# Patient Record
Sex: FEMALE | Race: WHITE | NOT HISPANIC OR LATINO | Employment: OTHER | URBAN - METROPOLITAN AREA
[De-identification: names, ages, dates, MRNs, and addresses within clinical notes are randomized per-mention and may not be internally consistent; named-entity substitution may affect disease eponyms.]

---

## 2024-04-26 ENCOUNTER — PRE-ADMISSION TESTING (OUTPATIENT)
Dept: PREADMISSION TESTING | Facility: HOSPITAL | Age: 79
End: 2024-04-26
Payer: MEDICARE

## 2024-04-26 VITALS — HEIGHT: 64 IN | BODY MASS INDEX: 36.36 KG/M2 | WEIGHT: 212.96 LBS

## 2024-04-26 LAB
ANION GAP SERPL CALCULATED.3IONS-SCNC: 12 MMOL/L (ref 5–15)
BUN SERPL-MCNC: 12 MG/DL (ref 8–23)
BUN/CREAT SERPL: 23.1 (ref 7–25)
CALCIUM SPEC-SCNC: 9.1 MG/DL (ref 8.6–10.5)
CHLORIDE SERPL-SCNC: 108 MMOL/L (ref 98–107)
CO2 SERPL-SCNC: 23 MMOL/L (ref 22–29)
CREAT SERPL-MCNC: 0.52 MG/DL (ref 0.57–1)
DEPRECATED RDW RBC AUTO: 46.9 FL (ref 37–54)
EGFRCR SERPLBLD CKD-EPI 2021: 95.8 ML/MIN/1.73
ERYTHROCYTE [DISTWIDTH] IN BLOOD BY AUTOMATED COUNT: 13.3 % (ref 12.3–15.4)
GLUCOSE SERPL-MCNC: 145 MG/DL (ref 65–99)
HBA1C MFR BLD: 6.6 % (ref 4.8–5.6)
HCT VFR BLD AUTO: 47.9 % (ref 34–46.6)
HGB BLD-MCNC: 15.5 G/DL (ref 12–15.9)
INR PPP: 1.12 (ref 0.89–1.12)
MCH RBC QN AUTO: 31 PG (ref 26.6–33)
MCHC RBC AUTO-ENTMCNC: 32.4 G/DL (ref 31.5–35.7)
MCV RBC AUTO: 95.8 FL (ref 79–97)
PLATELET # BLD AUTO: 246 10*3/MM3 (ref 140–450)
PMV BLD AUTO: 9.1 FL (ref 6–12)
POTASSIUM SERPL-SCNC: 3.8 MMOL/L (ref 3.5–5.2)
PROTHROMBIN TIME: 14.6 SECONDS (ref 12.2–14.5)
QT INTERVAL: 376 MS
QTC INTERVAL: 457 MS
RBC # BLD AUTO: 5 10*6/MM3 (ref 3.77–5.28)
SODIUM SERPL-SCNC: 143 MMOL/L (ref 136–145)
WBC NRBC COR # BLD AUTO: 8.99 10*3/MM3 (ref 3.4–10.8)

## 2024-04-26 PROCEDURE — 85610 PROTHROMBIN TIME: CPT

## 2024-04-26 PROCEDURE — 83036 HEMOGLOBIN GLYCOSYLATED A1C: CPT

## 2024-04-26 PROCEDURE — 93005 ELECTROCARDIOGRAM TRACING: CPT

## 2024-04-26 PROCEDURE — 85027 COMPLETE CBC AUTOMATED: CPT

## 2024-04-26 PROCEDURE — 80048 BASIC METABOLIC PNL TOTAL CA: CPT

## 2024-04-26 PROCEDURE — 36415 COLL VENOUS BLD VENIPUNCTURE: CPT

## 2024-04-26 PROCEDURE — 93010 ELECTROCARDIOGRAM REPORT: CPT | Performed by: INTERNAL MEDICINE

## 2024-04-26 RX ORDER — BISOPROLOL FUMARATE 5 MG/1
5 TABLET, FILM COATED ORAL DAILY
COMMUNITY

## 2024-04-26 RX ORDER — DIGOXIN 125 MCG
125 TABLET ORAL
COMMUNITY

## 2024-04-26 RX ORDER — DAPAGLIFLOZIN 10 MG/1
TABLET, FILM COATED ORAL
COMMUNITY

## 2024-04-26 NOTE — PAT
SURVEY COMPLETED IN [facility] WITH NARCOTIC SCREEN    PT INFORMED TO FOLLOW UP WITH COY OFFICE ABOUT TRULICITY LAST DOSE (AWARE TO HOLD FOR WEEK) AND ELIQUIS STOPPAGE- NO STENTS- A FIB RELATED.  PT VERBALIZE UNDERSTANDING.     Clean catch urinalysis not indicated because patient denied recent urinary frequency, urinary urgency, burning/pain upon urination, or flank pain. No recent UTIs.      Patient to apply Chlorhexadine wipes  to surgical area (as instructed) the night before procedure and the AM of procedure. Wipes provided.    Patient instructed to drink 20 ounces of Gatorade or Gatorlyte (if diabetic) and it needs to be completed 1 hour (for Main OR patients) or 2 hours (scheduled  section & [facility] patients) before given arrival time for procedure (NO RED Gatorade and NO Gatorade Zero).    Patient verbalized understanding.    Per Anesthesia Request, patient instructed not to take their ACE/ARB medications on the AM of surgery.    Discussed with patient options for receiving total joint replacement education and assessed patient's ability and preference. Joint Replacement Guide given to patient during PAT visit since not received a copy within the last year. Encouraged patient/family to read guide thoroughly and notify PAT staff with any questions or concerns. Handout provided directing patient to links to watch online videos related to joint replacement surgery on the [facility] website. The handout gives detailed instructions for joining an online joint replacement class through Zoom or phone conference offered on . Patient agreed to participate by watching videos online. Patient verbalized understanding of instructions and to complete the online learning tool survey. Encouraged to share information with family and/or . An overview of the joint replacement education was provided during the visit including general perioperative instructions that are routine for all surgical patients (PAT  PASS, wipes, directions to pre-op, etc.).    In[employer] (by [employer]) pain pump patient informational handout given to patient.  Instructed patient to watch [employer] Patient Education Video regarding Peripheral Nerve Catheter that will be in place for upcoming surgery unless contraindicated. The video can be accessed using QR code noted on handout.  Patient agreed to watch video.  Stressed to patient to call [facility] Hotline 24/7 if patient has any questions or concerns after discharge.     It was noted during Pre Admission Testing that patient was wearing some form of fingernail polish (gel/regular) and/or acrylic/artificial nails.  Patient was told that polish and/or artificial nails must be removed for surgery.  If a patient had recent manicure, and would rather not remove polish or artificial nails. Then the minimum requirement is that the polish/artificial nails must be removed from the middle finger on each hand.  Patient verbalized understanding.    If patient was having surgery on an upper extremity, then the patient was instructed that fingernail polish/artificial fingernails must be removed for surgery.  NO EXCEPTIONS.  Patient verbalized understanding.    If patient was having surgery on a lower extremity, then the patient was instructed that toenail polish on both extremities must be removed for surgery.  NO EXCEPTIONS. Patient verbalized understanding.

## 2024-05-08 ENCOUNTER — ANESTHESIA EVENT (OUTPATIENT)
Dept: PERIOP | Facility: HOSPITAL | Age: 79
End: 2024-05-08
Payer: MEDICARE

## 2024-05-08 RX ORDER — SODIUM CHLORIDE 0.9 % (FLUSH) 0.9 %
10 SYRINGE (ML) INJECTION AS NEEDED
Status: CANCELLED | OUTPATIENT
Start: 2024-05-08

## 2024-05-08 RX ORDER — SODIUM CHLORIDE 0.9 % (FLUSH) 0.9 %
10 SYRINGE (ML) INJECTION EVERY 12 HOURS SCHEDULED
Status: CANCELLED | OUTPATIENT
Start: 2024-05-08

## 2024-05-08 RX ORDER — FAMOTIDINE 10 MG/ML
20 INJECTION, SOLUTION INTRAVENOUS ONCE
Status: CANCELLED | OUTPATIENT
Start: 2024-05-08 | End: 2024-05-08

## 2024-05-08 RX ORDER — SODIUM CHLORIDE 9 MG/ML
40 INJECTION, SOLUTION INTRAVENOUS AS NEEDED
Status: CANCELLED | OUTPATIENT
Start: 2024-05-08

## 2024-05-09 ENCOUNTER — APPOINTMENT (OUTPATIENT)
Dept: GENERAL RADIOLOGY | Facility: HOSPITAL | Age: 79
End: 2024-05-09
Payer: MEDICARE

## 2024-05-09 ENCOUNTER — ANESTHESIA (OUTPATIENT)
Dept: PERIOP | Facility: HOSPITAL | Age: 79
End: 2024-05-09
Payer: MEDICARE

## 2024-05-09 ENCOUNTER — ANESTHESIA EVENT CONVERTED (OUTPATIENT)
Dept: ANESTHESIOLOGY | Facility: HOSPITAL | Age: 79
End: 2024-05-09
Payer: MEDICARE

## 2024-05-09 ENCOUNTER — HOSPITAL ENCOUNTER (OUTPATIENT)
Facility: HOSPITAL | Age: 79
Discharge: HOME | End: 2024-05-11
Attending: ORTHOPAEDIC SURGERY | Admitting: ORTHOPAEDIC SURGERY
Payer: MEDICARE

## 2024-05-09 DIAGNOSIS — Z96.652 STATUS POST LEFT KNEE REPLACEMENT: Primary | ICD-10-CM

## 2024-05-09 DIAGNOSIS — M17.12 PRIMARY LOCALIZED OSTEOARTHRITIS OF LEFT KNEE: ICD-10-CM

## 2024-05-09 PROBLEM — I48.91 A-FIB: Status: ACTIVE | Noted: 2024-05-09

## 2024-05-09 PROBLEM — I10 HYPERTENSION: Status: ACTIVE | Noted: 2024-05-09

## 2024-05-09 PROBLEM — E11.9 DIABETES: Status: ACTIVE | Noted: 2024-05-09

## 2024-05-09 LAB
GLUCOSE BLDC GLUCOMTR-MCNC: 116 MG/DL (ref 70–130)
GLUCOSE BLDC GLUCOMTR-MCNC: 144 MG/DL (ref 70–130)
GLUCOSE BLDC GLUCOMTR-MCNC: 156 MG/DL (ref 70–130)
GLUCOSE BLDC GLUCOMTR-MCNC: 162 MG/DL (ref 70–130)
GLUCOSE BLDC GLUCOMTR-MCNC: 169 MG/DL (ref 70–130)
POTASSIUM SERPL-SCNC: 3.9 MMOL/L (ref 3.5–5.2)

## 2024-05-09 PROCEDURE — 25810000003 SODIUM CHLORIDE 0.9 % SOLUTION: Performed by: ORTHOPAEDIC SURGERY

## 2024-05-09 PROCEDURE — 25010000002 FENTANYL CITRATE (PF) 50 MCG/ML SOLUTION

## 2024-05-09 PROCEDURE — C1776 JOINT DEVICE (IMPLANTABLE): HCPCS | Performed by: ORTHOPAEDIC SURGERY

## 2024-05-09 PROCEDURE — C1755 CATHETER, INTRASPINAL: HCPCS | Performed by: ORTHOPAEDIC SURGERY

## 2024-05-09 PROCEDURE — 63710000001 ATORVASTATIN 40 MG TABLET: Performed by: NURSE PRACTITIONER

## 2024-05-09 PROCEDURE — 73560 X-RAY EXAM OF KNEE 1 OR 2: CPT

## 2024-05-09 PROCEDURE — 84132 ASSAY OF SERUM POTASSIUM: CPT | Performed by: ANESTHESIOLOGY

## 2024-05-09 PROCEDURE — 25010000002 ROPIVACAINE HCL-NACL 0.2-0.9 % SOLUTION: Performed by: NURSE ANESTHETIST, CERTIFIED REGISTERED

## 2024-05-09 PROCEDURE — 25810000003 LACTATED RINGERS PER 1000 ML: Performed by: ANESTHESIOLOGY

## 2024-05-09 PROCEDURE — 82948 REAGENT STRIP/BLOOD GLUCOSE: CPT

## 2024-05-09 PROCEDURE — 25010000002 DEXAMETHASONE PER 1 MG: Performed by: NURSE ANESTHETIST, CERTIFIED REGISTERED

## 2024-05-09 PROCEDURE — 97162 PT EVAL MOD COMPLEX 30 MIN: CPT

## 2024-05-09 PROCEDURE — 63710000001 LISINOPRIL 10 MG TABLET: Performed by: NURSE PRACTITIONER

## 2024-05-09 PROCEDURE — 63710000001 ACETAMINOPHEN EXTRA STRENGTH 500 MG TABLET: Performed by: ORTHOPAEDIC SURGERY

## 2024-05-09 PROCEDURE — A9270 NON-COVERED ITEM OR SERVICE: HCPCS | Performed by: ORTHOPAEDIC SURGERY

## 2024-05-09 PROCEDURE — 25010000002 ONDANSETRON PER 1 MG: Performed by: NURSE ANESTHETIST, CERTIFIED REGISTERED

## 2024-05-09 PROCEDURE — 25010000002 CEFAZOLIN PER 500 MG: Performed by: ORTHOPAEDIC SURGERY

## 2024-05-09 PROCEDURE — 63710000001 OXYCODONE 5 MG TABLET: Performed by: ORTHOPAEDIC SURGERY

## 2024-05-09 PROCEDURE — 25010000002 MORPHINE SULFATE (PF) 10 MG/ML SOLUTION 1 ML VIAL: Performed by: ORTHOPAEDIC SURGERY

## 2024-05-09 PROCEDURE — C1713 ANCHOR/SCREW BN/BN,TIS/BN: HCPCS | Performed by: ORTHOPAEDIC SURGERY

## 2024-05-09 PROCEDURE — 97116 GAIT TRAINING THERAPY: CPT

## 2024-05-09 PROCEDURE — 25010000002 ROPIVACAINE PER 1 MG: Performed by: ORTHOPAEDIC SURGERY

## 2024-05-09 PROCEDURE — 63710000001 INSULIN LISPRO (HUMAN) PER 5 UNITS: Performed by: NURSE PRACTITIONER

## 2024-05-09 PROCEDURE — 25010000002 BUPIVACAINE (PF) 0.25 % SOLUTION: Performed by: NURSE ANESTHETIST, CERTIFIED REGISTERED

## 2024-05-09 PROCEDURE — A9270 NON-COVERED ITEM OR SERVICE: HCPCS | Performed by: NURSE PRACTITIONER

## 2024-05-09 PROCEDURE — 25010000002 MEPIVACAINE HCL (PF) 1.5 % SOLUTION: Performed by: NURSE ANESTHETIST, CERTIFIED REGISTERED

## 2024-05-09 PROCEDURE — 25010000002 PROPOFOL 10 MG/ML EMULSION: Performed by: NURSE ANESTHETIST, CERTIFIED REGISTERED

## 2024-05-09 PROCEDURE — 25010000002 MIDAZOLAM PER 1 MG: Performed by: ANESTHESIOLOGY

## 2024-05-09 DEVICE — LEGION PS OXINIUM FEMORAL SZ 7 LEFT
Type: IMPLANTABLE DEVICE | Site: KNEE | Status: FUNCTIONAL
Brand: LEGION

## 2024-05-09 DEVICE — DEV CONTRL TISS STRATAFIX SPIRAL MNCRYL PLS PS 3/0 30CM UD: Type: IMPLANTABLE DEVICE | Site: KNEE | Status: FUNCTIONAL

## 2024-05-09 DEVICE — GENESIS II NON-POROUS TIBIAL                                    BASEPLATE SIZE 5 LEFT
Type: IMPLANTABLE DEVICE | Site: KNEE | Status: FUNCTIONAL
Brand: GENESIS II

## 2024-05-09 DEVICE — IMPLANTABLE DEVICE: Type: IMPLANTABLE DEVICE | Site: KNEE | Status: FUNCTIONAL

## 2024-05-09 DEVICE — LEGION PS HIGH FLEX XLPE SZ 5-6 10MM
Type: IMPLANTABLE DEVICE | Site: KNEE | Status: FUNCTIONAL
Brand: LEGION

## 2024-05-09 DEVICE — GENESIS II RESURFACING PATELLAR                                    PROSTHESIS  32MM
Type: IMPLANTABLE DEVICE | Site: KNEE | Status: FUNCTIONAL
Brand: GENESIS II

## 2024-05-09 DEVICE — CMT BONE PALACOS R HI/VISC 1X40: Type: IMPLANTABLE DEVICE | Site: KNEE | Status: FUNCTIONAL

## 2024-05-09 DEVICE — DEV CONTRL TISS STRATAFIX SYMM PDS PLUS VIL CT-1 60CM: Type: IMPLANTABLE DEVICE | Site: KNEE | Status: FUNCTIONAL

## 2024-05-09 RX ORDER — DIPHENHYDRAMINE HYDROCHLORIDE 50 MG/ML
25 INJECTION INTRAMUSCULAR; INTRAVENOUS EVERY 6 HOURS PRN
Status: DISCONTINUED | OUTPATIENT
Start: 2024-05-09 | End: 2024-05-11 | Stop reason: HOSPADM

## 2024-05-09 RX ORDER — FAMOTIDINE 20 MG/1
20 TABLET, FILM COATED ORAL ONCE
Status: COMPLETED | OUTPATIENT
Start: 2024-05-09 | End: 2024-05-09

## 2024-05-09 RX ORDER — HYDROMORPHONE HYDROCHLORIDE 1 MG/ML
0.5 INJECTION, SOLUTION INTRAMUSCULAR; INTRAVENOUS; SUBCUTANEOUS
Status: DISCONTINUED | OUTPATIENT
Start: 2024-05-09 | End: 2024-05-09 | Stop reason: HOSPADM

## 2024-05-09 RX ORDER — DIPHENHYDRAMINE HCL 25 MG
25 CAPSULE ORAL EVERY 6 HOURS PRN
Status: DISCONTINUED | OUTPATIENT
Start: 2024-05-09 | End: 2024-05-11 | Stop reason: HOSPADM

## 2024-05-09 RX ORDER — SODIUM CHLORIDE, SODIUM LACTATE, POTASSIUM CHLORIDE, CALCIUM CHLORIDE 600; 310; 30; 20 MG/100ML; MG/100ML; MG/100ML; MG/100ML
9 INJECTION, SOLUTION INTRAVENOUS CONTINUOUS
Status: DISCONTINUED | OUTPATIENT
Start: 2024-05-09 | End: 2024-05-11 | Stop reason: HOSPADM

## 2024-05-09 RX ORDER — DEXTROSE MONOHYDRATE 25 G/50ML
25 INJECTION, SOLUTION INTRAVENOUS
Status: DISCONTINUED | OUTPATIENT
Start: 2024-05-09 | End: 2024-05-11 | Stop reason: HOSPADM

## 2024-05-09 RX ORDER — DIGOXIN 125 MCG
125 TABLET ORAL
Status: DISCONTINUED | OUTPATIENT
Start: 2024-05-10 | End: 2024-05-11 | Stop reason: HOSPADM

## 2024-05-09 RX ORDER — BISACODYL 5 MG/1
10 TABLET, DELAYED RELEASE ORAL DAILY PRN
Status: DISCONTINUED | OUTPATIENT
Start: 2024-05-09 | End: 2024-05-11 | Stop reason: HOSPADM

## 2024-05-09 RX ORDER — TRANEXAMIC ACID 10 MG/ML
1000 INJECTION, SOLUTION INTRAVENOUS ONCE
Status: COMPLETED | OUTPATIENT
Start: 2024-05-09 | End: 2024-05-09

## 2024-05-09 RX ORDER — ONDANSETRON 2 MG/ML
INJECTION INTRAMUSCULAR; INTRAVENOUS AS NEEDED
Status: DISCONTINUED | OUTPATIENT
Start: 2024-05-09 | End: 2024-05-09 | Stop reason: SURG

## 2024-05-09 RX ORDER — MAGNESIUM HYDROXIDE 1200 MG/15ML
LIQUID ORAL AS NEEDED
Status: DISCONTINUED | OUTPATIENT
Start: 2024-05-09 | End: 2024-05-09 | Stop reason: HOSPADM

## 2024-05-09 RX ORDER — DULOXETIN HYDROCHLORIDE 60 MG/1
60 CAPSULE, DELAYED RELEASE ORAL DAILY
Status: DISCONTINUED | OUTPATIENT
Start: 2024-05-10 | End: 2024-05-11 | Stop reason: HOSPADM

## 2024-05-09 RX ORDER — IBUPROFEN 600 MG/1
1 TABLET ORAL
Status: DISCONTINUED | OUTPATIENT
Start: 2024-05-09 | End: 2024-05-11 | Stop reason: HOSPADM

## 2024-05-09 RX ORDER — INSULIN LISPRO 100 [IU]/ML
2-7 INJECTION, SOLUTION INTRAVENOUS; SUBCUTANEOUS
Status: DISCONTINUED | OUTPATIENT
Start: 2024-05-09 | End: 2024-05-11 | Stop reason: HOSPADM

## 2024-05-09 RX ORDER — ATORVASTATIN CALCIUM 40 MG/1
40 TABLET, FILM COATED ORAL NIGHTLY
Status: DISCONTINUED | OUTPATIENT
Start: 2024-05-09 | End: 2024-05-11 | Stop reason: HOSPADM

## 2024-05-09 RX ORDER — ONDANSETRON 2 MG/ML
4 INJECTION INTRAMUSCULAR; INTRAVENOUS ONCE AS NEEDED
Status: DISCONTINUED | OUTPATIENT
Start: 2024-05-09 | End: 2024-05-09 | Stop reason: HOSPADM

## 2024-05-09 RX ORDER — OXYCODONE HYDROCHLORIDE 10 MG/1
10 TABLET ORAL EVERY 4 HOURS PRN
Status: DISCONTINUED | OUTPATIENT
Start: 2024-05-09 | End: 2024-05-11 | Stop reason: HOSPADM

## 2024-05-09 RX ORDER — BISACODYL 10 MG
10 SUPPOSITORY, RECTAL RECTAL DAILY PRN
Status: DISCONTINUED | OUTPATIENT
Start: 2024-05-09 | End: 2024-05-11 | Stop reason: HOSPADM

## 2024-05-09 RX ORDER — SODIUM CHLORIDE 9 MG/ML
100 INJECTION, SOLUTION INTRAVENOUS CONTINUOUS
Status: DISCONTINUED | OUTPATIENT
Start: 2024-05-09 | End: 2024-05-11 | Stop reason: HOSPADM

## 2024-05-09 RX ORDER — ONDANSETRON 2 MG/ML
4 INJECTION INTRAMUSCULAR; INTRAVENOUS EVERY 6 HOURS PRN
Status: DISCONTINUED | OUTPATIENT
Start: 2024-05-09 | End: 2024-05-09 | Stop reason: SDUPTHER

## 2024-05-09 RX ORDER — HYDROXYZINE HYDROCHLORIDE 25 MG/1
25 TABLET, FILM COATED ORAL 3 TIMES DAILY PRN
Status: DISCONTINUED | OUTPATIENT
Start: 2024-05-09 | End: 2024-05-09

## 2024-05-09 RX ORDER — ACETAMINOPHEN 500 MG
1000 TABLET ORAL EVERY 6 HOURS
Status: DISCONTINUED | OUTPATIENT
Start: 2024-05-09 | End: 2024-05-11 | Stop reason: HOSPADM

## 2024-05-09 RX ORDER — BISOPROLOL FUMARATE 5 MG/1
5 TABLET, FILM COATED ORAL DAILY
Status: DISCONTINUED | OUTPATIENT
Start: 2024-05-10 | End: 2024-05-11 | Stop reason: HOSPADM

## 2024-05-09 RX ORDER — DOCUSATE SODIUM 100 MG/1
100 CAPSULE, LIQUID FILLED ORAL 2 TIMES DAILY PRN
Status: DISCONTINUED | OUTPATIENT
Start: 2024-05-09 | End: 2024-05-11 | Stop reason: HOSPADM

## 2024-05-09 RX ORDER — MIDAZOLAM HYDROCHLORIDE 1 MG/ML
1 INJECTION INTRAMUSCULAR; INTRAVENOUS ONCE
Status: COMPLETED | OUTPATIENT
Start: 2024-05-09 | End: 2024-05-09

## 2024-05-09 RX ORDER — LISINOPRIL 10 MG/1
10 TABLET ORAL DAILY
Status: DISCONTINUED | OUTPATIENT
Start: 2024-05-09 | End: 2024-05-11 | Stop reason: HOSPADM

## 2024-05-09 RX ORDER — LABETALOL HYDROCHLORIDE 5 MG/ML
10 INJECTION, SOLUTION INTRAVENOUS EVERY 4 HOURS PRN
Status: DISCONTINUED | OUTPATIENT
Start: 2024-05-09 | End: 2024-05-11 | Stop reason: HOSPADM

## 2024-05-09 RX ORDER — NALOXONE HCL 0.4 MG/ML
0.1 VIAL (ML) INJECTION
Status: DISCONTINUED | OUTPATIENT
Start: 2024-05-09 | End: 2024-05-11 | Stop reason: HOSPADM

## 2024-05-09 RX ORDER — BUPIVACAINE HYDROCHLORIDE 2.5 MG/ML
INJECTION, SOLUTION EPIDURAL; INFILTRATION; INTRACAUDAL
Status: DISCONTINUED | OUTPATIENT
Start: 2024-05-09 | End: 2024-05-09 | Stop reason: SURG

## 2024-05-09 RX ORDER — DEXAMETHASONE SODIUM PHOSPHATE 4 MG/ML
INJECTION, SOLUTION INTRA-ARTICULAR; INTRALESIONAL; INTRAMUSCULAR; INTRAVENOUS; SOFT TISSUE AS NEEDED
Status: DISCONTINUED | OUTPATIENT
Start: 2024-05-09 | End: 2024-05-09 | Stop reason: SURG

## 2024-05-09 RX ORDER — ONDANSETRON 2 MG/ML
4 INJECTION INTRAMUSCULAR; INTRAVENOUS EVERY 6 HOURS PRN
Status: DISCONTINUED | OUTPATIENT
Start: 2024-05-09 | End: 2024-05-11 | Stop reason: HOSPADM

## 2024-05-09 RX ORDER — KETOROLAC TROMETHAMINE 30 MG/ML
15 INJECTION, SOLUTION INTRAMUSCULAR; INTRAVENOUS EVERY 6 HOURS PRN
Status: DISCONTINUED | OUTPATIENT
Start: 2024-05-09 | End: 2024-05-11 | Stop reason: HOSPADM

## 2024-05-09 RX ORDER — FENTANYL CITRATE 50 UG/ML
50 INJECTION, SOLUTION INTRAMUSCULAR; INTRAVENOUS
Status: DISCONTINUED | OUTPATIENT
Start: 2024-05-09 | End: 2024-05-09 | Stop reason: HOSPADM

## 2024-05-09 RX ORDER — LIDOCAINE HYDROCHLORIDE 10 MG/ML
0.5 INJECTION, SOLUTION EPIDURAL; INFILTRATION; INTRACAUDAL; PERINEURAL ONCE AS NEEDED
Status: COMPLETED | OUTPATIENT
Start: 2024-05-09 | End: 2024-05-09

## 2024-05-09 RX ORDER — LIDOCAINE HYDROCHLORIDE 10 MG/ML
INJECTION, SOLUTION EPIDURAL; INFILTRATION; INTRACAUDAL; PERINEURAL AS NEEDED
Status: DISCONTINUED | OUTPATIENT
Start: 2024-05-09 | End: 2024-05-09 | Stop reason: SURG

## 2024-05-09 RX ORDER — OXYCODONE HYDROCHLORIDE 5 MG/1
5 TABLET ORAL EVERY 4 HOURS PRN
Status: DISCONTINUED | OUTPATIENT
Start: 2024-05-09 | End: 2024-05-11 | Stop reason: HOSPADM

## 2024-05-09 RX ORDER — MIDAZOLAM HYDROCHLORIDE 1 MG/ML
0.5 INJECTION INTRAMUSCULAR; INTRAVENOUS
Status: DISCONTINUED | OUTPATIENT
Start: 2024-05-09 | End: 2024-05-09 | Stop reason: HOSPADM

## 2024-05-09 RX ORDER — ONDANSETRON 4 MG/1
4 TABLET, ORALLY DISINTEGRATING ORAL EVERY 6 HOURS PRN
Status: DISCONTINUED | OUTPATIENT
Start: 2024-05-09 | End: 2024-05-11 | Stop reason: HOSPADM

## 2024-05-09 RX ORDER — NICOTINE POLACRILEX 4 MG
15 LOZENGE BUCCAL
Status: DISCONTINUED | OUTPATIENT
Start: 2024-05-09 | End: 2024-05-11 | Stop reason: HOSPADM

## 2024-05-09 RX ORDER — ACETAMINOPHEN 500 MG
1000 TABLET ORAL ONCE
Status: COMPLETED | OUTPATIENT
Start: 2024-05-09 | End: 2024-05-09

## 2024-05-09 RX ORDER — AMOXICILLIN 250 MG
2 CAPSULE ORAL 2 TIMES DAILY PRN
Status: DISCONTINUED | OUTPATIENT
Start: 2024-05-09 | End: 2024-05-11 | Stop reason: HOSPADM

## 2024-05-09 RX ORDER — ROPIVACAINE HYDROCHLORIDE 2 MG/ML
INJECTION, SOLUTION EPIDURAL; INFILTRATION; PERINEURAL CONTINUOUS
Status: DISCONTINUED | OUTPATIENT
Start: 2024-05-09 | End: 2024-05-11 | Stop reason: HOSPADM

## 2024-05-09 RX ORDER — PREGABALIN 75 MG/1
75 CAPSULE ORAL ONCE
Status: COMPLETED | OUTPATIENT
Start: 2024-05-09 | End: 2024-05-09

## 2024-05-09 RX ORDER — HYDROXYZINE HYDROCHLORIDE 25 MG/1
25 TABLET, FILM COATED ORAL 3 TIMES DAILY PRN
Status: DISCONTINUED | OUTPATIENT
Start: 2024-05-09 | End: 2024-05-11 | Stop reason: HOSPADM

## 2024-05-09 RX ORDER — HYDROMORPHONE HYDROCHLORIDE 1 MG/ML
0.5 INJECTION, SOLUTION INTRAMUSCULAR; INTRAVENOUS; SUBCUTANEOUS
Status: DISCONTINUED | OUTPATIENT
Start: 2024-05-09 | End: 2024-05-11 | Stop reason: HOSPADM

## 2024-05-09 RX ORDER — FENTANYL CITRATE 50 UG/ML
INJECTION, SOLUTION INTRAMUSCULAR; INTRAVENOUS
Status: COMPLETED
Start: 2024-05-09 | End: 2024-05-09

## 2024-05-09 RX ORDER — MELOXICAM 15 MG/1
15 TABLET ORAL ONCE
Status: COMPLETED | OUTPATIENT
Start: 2024-05-09 | End: 2024-05-09

## 2024-05-09 RX ORDER — PROPOFOL 10 MG/ML
VIAL (ML) INTRAVENOUS AS NEEDED
Status: DISCONTINUED | OUTPATIENT
Start: 2024-05-09 | End: 2024-05-09 | Stop reason: SURG

## 2024-05-09 RX ADMIN — MIDAZOLAM HYDROCHLORIDE 1 MG: 1 INJECTION, SOLUTION INTRAMUSCULAR; INTRAVENOUS at 09:05

## 2024-05-09 RX ADMIN — FENTANYL CITRATE 50 MCG: 50 INJECTION, SOLUTION INTRAMUSCULAR; INTRAVENOUS at 11:56

## 2024-05-09 RX ADMIN — PROPOFOL 40 MG: 10 INJECTION, EMULSION INTRAVENOUS at 10:02

## 2024-05-09 RX ADMIN — ACETAMINOPHEN 1000 MG: 500 TABLET ORAL at 08:36

## 2024-05-09 RX ADMIN — TRANEXAMIC ACID 1000 MG: 10 INJECTION, SOLUTION INTRAVENOUS at 11:27

## 2024-05-09 RX ADMIN — BUPIVACAINE HYDROCHLORIDE 20 ML: 2.5 INJECTION, SOLUTION EPIDURAL; INFILTRATION; INTRACAUDAL; PERINEURAL at 11:56

## 2024-05-09 RX ADMIN — PREGABALIN 75 MG: 75 CAPSULE ORAL at 08:37

## 2024-05-09 RX ADMIN — MEPIVACAINE HYDROCHLORIDE 4 ML: 15 INJECTION, SOLUTION EPIDURAL; INFILTRATION at 10:03

## 2024-05-09 RX ADMIN — SODIUM CHLORIDE 2000 MG: 900 INJECTION INTRAVENOUS at 10:02

## 2024-05-09 RX ADMIN — SODIUM CHLORIDE, POTASSIUM CHLORIDE, SODIUM LACTATE AND CALCIUM CHLORIDE: 600; 310; 30; 20 INJECTION, SOLUTION INTRAVENOUS at 10:43

## 2024-05-09 RX ADMIN — SODIUM CHLORIDE 100 ML/HR: 9 INJECTION, SOLUTION INTRAVENOUS at 16:23

## 2024-05-09 RX ADMIN — Medication 1000 MG: at 11:57

## 2024-05-09 RX ADMIN — FAMOTIDINE 20 MG: 20 TABLET, FILM COATED ORAL at 08:37

## 2024-05-09 RX ADMIN — LISINOPRIL 10 MG: 10 TABLET ORAL at 16:23

## 2024-05-09 RX ADMIN — MELOXICAM 15 MG: 15 TABLET ORAL at 08:37

## 2024-05-09 RX ADMIN — PROPOFOL 77 MCG/KG/MIN: 10 INJECTION, EMULSION INTRAVENOUS at 10:02

## 2024-05-09 RX ADMIN — INSULIN LISPRO 2 UNITS: 100 INJECTION, SOLUTION INTRAVENOUS; SUBCUTANEOUS at 21:40

## 2024-05-09 RX ADMIN — ATORVASTATIN CALCIUM 40 MG: 40 TABLET, FILM COATED ORAL at 21:33

## 2024-05-09 RX ADMIN — LIDOCAINE HYDROCHLORIDE 0.5 ML: 10 INJECTION, SOLUTION EPIDURAL; INFILTRATION; INTRACAUDAL; PERINEURAL at 08:23

## 2024-05-09 RX ADMIN — ACETAMINOPHEN 1000 MG: 500 TABLET ORAL at 21:32

## 2024-05-09 RX ADMIN — ACETAMINOPHEN 1000 MG: 500 TABLET ORAL at 16:23

## 2024-05-09 RX ADMIN — SODIUM CHLORIDE, POTASSIUM CHLORIDE, SODIUM LACTATE AND CALCIUM CHLORIDE 9 ML/HR: 600; 310; 30; 20 INJECTION, SOLUTION INTRAVENOUS at 08:24

## 2024-05-09 RX ADMIN — SODIUM CHLORIDE 2 G: 900 INJECTION INTRAVENOUS at 18:04

## 2024-05-09 RX ADMIN — LIDOCAINE HYDROCHLORIDE 50 MG: 10 INJECTION, SOLUTION EPIDURAL; INFILTRATION; INTRACAUDAL; PERINEURAL at 10:02

## 2024-05-09 RX ADMIN — TRANEXAMIC ACID 1000 MG: 10 INJECTION, SOLUTION INTRAVENOUS at 10:02

## 2024-05-09 RX ADMIN — INSULIN LISPRO 2 UNITS: 100 INJECTION, SOLUTION INTRAVENOUS; SUBCUTANEOUS at 16:43

## 2024-05-09 RX ADMIN — SODIUM CHLORIDE 100 ML/HR: 9 INJECTION, SOLUTION INTRAVENOUS at 18:27

## 2024-05-09 RX ADMIN — ONDANSETRON 4 MG: 2 INJECTION INTRAMUSCULAR; INTRAVENOUS at 10:02

## 2024-05-09 RX ADMIN — OXYCODONE HYDROCHLORIDE 5 MG: 5 TABLET ORAL at 21:33

## 2024-05-09 RX ADMIN — DEXAMETHASONE SODIUM PHOSPHATE 8 MG: 4 INJECTION INTRA-ARTICULAR; INTRALESIONAL; INTRAMUSCULAR; INTRAVENOUS; SOFT TISSUE at 10:02

## 2024-05-09 NOTE — PLAN OF CARE
Goal Outcome Evaluation:  Plan of Care Reviewed With: patient        Progress: no change  Outcome Evaluation: Pt amb 80' with FWW and CGAx2. Step-to gait pattern observed. Activity limited by elevated pain. No knee buckling or LOB noted. HEP and precautions reviewed with pt. Additional ambulation tonight with nsg encouraged. PADD score = 3. Recommend d/c to IRF to promote increased independence with functional mobility and decreased risk for falls prior to returning home with limited support.      Anticipated Discharge Disposition (PT): inpatient rehabilitation facility

## 2024-05-09 NOTE — H&P
Pre-Op H&P  Lesvia Madera  [id number]  1945      Chief complaint: Left knee pain      Subjective:  Patient is a 78 y.o.female presents for scheduled surgery by Dr. Olivera. She anticipates a TOTAL KNEE ARTHROPLASTY WITH CORI ROBOT - LEFT - Left today.  The patient endorses having left knee pain for the past few years.  She endorses having stiffness in her left knee that is constant.  Physical therapy helped initially with the pain.  She does use a cane to ambulate.    The last dose of Eliquis was on 5/2/24.  + cardiac clearance in chart from 3/22/2024.    Review of Systems:  Constitutional-- No fever, chills or sweats. No fatigue.  CV-- No chest pain, palpitation or syncope. +HTN.  Resp-- No SOB, cough, hemoptysis  Skin--No rashes or lesions      Allergies:   Allergies   Allergen Reactions    Sulfamethoxazole-Trimethoprim Hives         Home Meds:  Medications Prior to Admission   Medication Sig Dispense Refill Last Dose    ATORVASTATIN CALCIUM PO Take  by mouth Daily.   5/9/2024 at 0500    bisoprolol (ZEBeta) 5 MG tablet Take 1 tablet by mouth Daily. 1.5 BID   5/9/2024 at 0500    dapagliflozin Propanediol (Farxiga) 10 MG tablet Take  by mouth.   5/9/2024 at 0500    digoxin (LANOXIN) 125 MCG tablet Take 1 tablet by mouth Daily.   5/9/2024 at 0500    DULoxetine HCl (CYMBALTA PO) Take  by mouth Daily.   5/9/2024 at 0500    SPIRONOLACTONE PO Take  by mouth.   5/9/2024 at 0500    Apixaban (ELIQUIS PO) Take  by mouth Daily.   5/2/2024    Dulaglutide (TRULICITY SC) Inject  under the skin into the appropriate area as directed 1 (One) Time Per Week. WEDNESDAY 5/1/2024    LISINOPRIL PO Take  by mouth Daily.   5/7/2024         PMH:   Past Medical History:   Diagnosis Date    Arthritis     BILAT HANDS    Atrial fibrillation     Diabetes mellitus     Hypertension     Tailbone injury     PT STATES ITS CROOKED A BIT    Wears glasses     READERS     PSH:    Past Surgical History:   Procedure Laterality Date    ANKLE  "SURGERY Right     PINS AND PLATES PLACED THEN REMOVED - NO HARDWARE IN PLACE NOW    BREAST BIOPSY Right     X1- NO MARKER    CATARACT EXTRACTION, BILATERAL Bilateral     WISDOM TOOTH EXTRACTION         Immunization History:  Influenza: 2024  Pneumococcal: Yes  Tetanus: Unsure  Covid : x4    Social History:   Tobacco:   Social History     Tobacco Use   Smoking Status Never   Smokeless Tobacco Never      Alcohol:     Social History     Substance and Sexual Activity   Alcohol Use Not Currently         Physical Exam:/62 (BP Location: Right arm, Patient Position: Lying)   Pulse 94   Temp 98.5 °F (36.9 °C) (Temporal)   Resp 18   Ht 162.6 cm (64\")   Wt 96.2 kg (212 lb)   SpO2 96%   BMI 36.39 kg/m²       General Appearance:    Alert, cooperative, no distress, appears stated age   Head:    Normocephalic, without obvious abnormality, atraumatic   Lungs:     Clear to auscultation bilaterally, respirations unlabored    Heart:   Regular rate and rhythm, S1 and S2 normal    Abdomen:    Soft without tenderness   Extremities:   Extremities normal, atraumatic, no cyanosis or edema   Skin:   Skin color, texture, turgor normal, no rashes or lesions   Neurologic:   Grossly intact     Results Review:     LABS:  Lab Results   Component Value Date    WBC 8.99 04/26/2024    HGB 15.5 04/26/2024    HCT 47.9 (H) 04/26/2024    MCV 95.8 04/26/2024     04/26/2024    GLUCOSE 145 (H) 04/26/2024    BUN 12 04/26/2024    CREATININE 0.52 (L) 04/26/2024     04/26/2024    K 3.8 04/26/2024     (H) 04/26/2024    CO2 23.0 04/26/2024    CALCIUM 9.1 04/26/2024       RADIOLOGY:  Imaging Results (Last 72 Hours)       ** No results found for the last 72 hours. **            I reviewed the patient's new clinical results.    Cancer Staging (if applicable)  Cancer Patient: __ yes _x_no __unknown; If yes, clinical stage T:__ N:__M:__, stage group or __N/A      Impression: Left knee arthritis      Plan: TOTAL KNEE ARTHROPLASTY WITH " HAMZAH ROBOT - LEFT - Left       Vic Lopez, APRN   5/9/2024   08:56 EDT

## 2024-05-09 NOTE — H&P
Patient Name: Lesvia Madera  MRN: [id number]  : 1945  DOS: 2024    Attending: Bar Olivera,*    Primary Care Provider: Luigi Mckeon MD      Chief complaint:  Left knee pain    Subjective   Patient is a pleasant 78 y.o. female presented for scheduled surgery by Dr. Olivera.  He underwent left total knee arthroplasty under spinal anesthesia.  She tolerated surgery well and was admitted for further medical management.  Her knee has been painful for over a year.  She uses a brace and cane with ambulation.  She denies recent falls.    When seen postop she is doing well.  Her pain is well-controlled.  She denies nausea, shortness of breath or chest pain.  No history of DVT or PE.    Allergies:  Allergies   Allergen Reactions    Sulfamethoxazole-Trimethoprim Hives       Meds:  Medications Prior to Admission   Medication Sig Dispense Refill Last Dose    ATORVASTATIN CALCIUM PO Take  by mouth Daily.   2024 at 0500    bisoprolol (ZEBeta) 5 MG tablet Take 1 tablet by mouth Daily. 1.5 BID   2024 at 0500    dapagliflozin Propanediol (Farxiga) 10 MG tablet Take  by mouth.   2024 at 0500    digoxin (LANOXIN) 125 MCG tablet Take 1 tablet by mouth Daily.   2024 at 0500    DULoxetine HCl (CYMBALTA PO) Take  by mouth Daily.   2024 at 0500    SPIRONOLACTONE PO Take  by mouth.   2024 at 0500    Apixaban (ELIQUIS PO) Take  by mouth Daily.   2024    Dulaglutide (TRULICITY SC) Inject  under the skin into the appropriate area as directed 1 (One) Time Per Week. 2024    LISINOPRIL PO Take  by mouth Daily.   2024         History:   Past Medical History:   Diagnosis Date    Arthritis     BILAT HANDS    Atrial fibrillation     Diabetes mellitus     Hypertension     Tailbone injury     PT STATES ITS CROOKED A BIT    Wears glasses     READERS     Past Surgical History:   Procedure Laterality Date    ANKLE SURGERY Right     PINS AND PLATES PLACED THEN REMOVED - NO  "HARDWARE IN PLACE NOW    BREAST BIOPSY Right     X1- NO MARKER    CATARACT EXTRACTION, BILATERAL Bilateral     WISDOM TOOTH EXTRACTION       History reviewed. No pertinent family history.  Social History     Tobacco Use    Smoking status: Never    Smokeless tobacco: Never   Vaping Use    Vaping status: Never Used   Substance Use Topics    Alcohol use: Not Currently    Drug use: Never   She lives alone and has 1 child.  She is retired teacher.    Review of Systems  All systems were reviewed and negative except for:  Gastrointestinal: positive for  constipation and diarrhea    Vital Signs  /78 (BP Location: Left arm, Patient Position: Lying)   Pulse 75   Temp 98.7 °F (37.1 °C) (Oral)   Resp 16   Ht 162.6 cm (64\")   Wt 96.2 kg (212 lb)   SpO2 95%   BMI 36.39 kg/m²     Physical Exam:    General Appearance:    Alert, cooperative, in no acute distress   Head:    Normocephalic, without obvious abnormality, atraumatic   Eyes:            Lids and lashes normal, conjunctivae and sclerae normal, no   icterus, no pallor, corneas clear,    Ears:    Ears appear intact with no abnormalities noted   Throat:   No oral lesions, no thrush, oral mucosa moist   Neck:   No adenopathy, supple, trachea midline, no thyromegaly    Lungs:     Clear to auscultation,respirations regular, even and unlabored    Heart:    Regular rhythm and normal rate, normal S1 and S2, no murmur, no gallop   Abdomen:     Normal bowel sounds, no masses, no organomegaly, soft non-tender, non-distended, no guarding, no rebound  tenderness   Genitalia:    Deferred   Extremities: Left knee Ace wrap CDI.  Nerve block present.   Pulses:   Pulses palpable and equal bilaterally   Skin:   No bleeding, bruising or rash   Neurologic:   Cranial nerves 2 - 12 grossly intact. Flexion and dorsiflexion intact bilateral feet.        I reviewed the patient's new clinical results.     Results from last 7 days   Lab Units 05/09/24  0843   POTASSIUM mmol/L 3.9     Lab " Results   Component Value Date    HGBA1C 6.60 (H) 04/26/2024      Latest Reference Range & Units 04/26/24 09:25   Sodium 136 - 145 mmol/L 143   Potassium 3.5 - 5.2 mmol/L 3.8   Chloride 98 - 107 mmol/L 108 (H)   CO2 22.0 - 29.0 mmol/L 23.0   Anion Gap 5.0 - 15.0 mmol/L 12.0   BUN 8 - 23 mg/dL 12   Creatinine 0.57 - 1.00 mg/dL 0.52 (L)   BUN/Creatinine Ratio 7.0 - 25.0  23.1   eGFR >60.0 mL/min/1.73 95.8   Glucose 65 - 99 mg/dL 145 (H)   Calcium 8.6 - 10.5 mg/dL 9.1   Hemoglobin A1C 4.80 - 5.60 % 6.60 (H)   WBC 3.40 - 10.80 10*3/mm3 8.99   RBC 3.77 - 5.28 10*6/mm3 5.00   Hemoglobin 12.0 - 15.9 g/dL 15.5   Hematocrit 34.0 - 46.6 % 47.9 (H)   Platelets 140 - 450 10*3/mm3 246   RDW 12.3 - 15.4 % 13.3   MCV 79.0 - 97.0 fL 95.8   MCH 26.6 - 33.0 pg 31.0   MCHC 31.5 - 35.7 g/dL 32.4   MPV 6.0 - 12.0 fL 9.1   (H): Data is abnormally high  (L): Data is abnormally low    Assessment and Plan:     Status post left knee replacement    Primary localized osteoarthritis of left knee    Hypertension    A-fib    Diabetes      Plan  1. PT/OT- WBAT LLE  2. Pain control-prns, AC nerve block  3. IS-encourage  4. DVT proph- Mechs/Eliquis  5. Bowel regimen  6. Resume home medications as appropriate  7. Monitor post-op labs  8. DC planning for home    HTN, Hyperlipidemia  - Continue home statin, Zebeta, digoxin and lisinopril  - Hold spironolactone for now  - Monitor BP   - Holding parameters for BP meds  - Labetalol PRN for SBP>170    DM  - hgb A1c on 4/26/24 6.6  - Hold OHA while inpatient  - Accu-Chek AC and HS with low dose SSI      KANDY Rudolph  05/09/24  16:04 EDT

## 2024-05-09 NOTE — BRIEF OP NOTE
TOTAL KNEE ARTHROPLASTY WITH CORI ROBOT  Progress Note    Lesvia Madera  5/9/2024    Pre-op Diagnosis:   Left knee osteoarthritis       Post-Op Diagnosis Codes:     * Primary osteoarthritis of left knee [M17.12]    Procedure/CPT® Codes:  PA ARTHRP KNE CONDYLE&PLATU MEDIAL&LAT COMPARTMENTS [53152]  PA CPTR-ASST SURGICAL NAVIGATION IMAGE-LESS [27793]      Procedure(s):  TOTAL KNEE ARTHROPLASTY WITH CORI ROBOT - LEFT    Surgical Approach: Knee Medial Parapatellar            Surgeon(s):  Bar Olivera MD    Assistant: BOBBY Rosas    Anesthesia: Spinal with local and adductor canal catheter    Staff:   Circulator: Pillo Alston RN; Kathy Choe RN  Scrub Person: Shannen Ferrer; Dmitriy Upton  Vendor Representative: Subhash Green ([employer])  Nursing Assistant: Adali Swartz  Assistant: Mino Cullen RNFA  Assistant: Mino Cullen RNFA      Estimated Blood Loss: minimal    Urine Voided: * No values recorded between 5/9/2024  9:58 AM and 5/9/2024 11:46 AM *    Specimens:                None          Drains: * No LDAs found *    Findings: Left knee severe tricompartmental degenerative changes with varus deformity        Complications: None    Plans: [employer] posterior stabilized total knee arthroplasty with a size 7 femur, 5 tibia, 10 polyethylene and 32 patella    Tourniquet time: 83 minutes at 300 mmHg    Assistant: Mino Cullen RNFA  was responsible for performing the following activities: Retraction, assistance with implantation of components and closure and their skilled assistance was necessary for the success of this case.    Bar Olivera MD     Date: 5/9/2024  Time: 12:06 EDT

## 2024-05-09 NOTE — OP NOTE
Preoperative diagnosis:Left knee end stage osteoarthritis    Postoperative diagnosis: Left knee end stage osteoarthritis    Operative procedure: Left total knee arthroplasty with Cori robot computer guidance    Surgeon: Dr. Nir Olivera    Assistant: BOBBY Rosas.  Necessary during the case for assistance with positioning of the patient, exposure, implantation of components and closure.  Necessary for the success and completion of the case.    Anesthesia: Spinal with local and adductor canal catheter    Estimated blood loss: Minimal    Surgical Approach: Knee Medial Parapatellar     Implants: [employer] Legion posterior stabilized total knee arthroplasty size 7 femur, 5 tibia, 32 patella, 10 polyethylene.    Tourniquet time: 83 minutes at 300 mmHg    Reasoning for added complexity modifier: Please note a 22 modifier will be added to this case due to the patient's BMI 36 which added significant time and complexity in order to gain adequate exposure to perform the total knee replacement.    Indications for procedure: Lesvia is a very pleasant 78-year-old female who has struggled with end-stage activity limiting left knee pain secondary to osteoarthritis.  X-rays in March revealed severe tricompartmental degenerative changes in a varus knee with complete loss of medial joint space and marginal osteophyte formation.  They have failed exhaustive conservative treatment measures including cortisone injections and physical therapy.  She takes Eliquis for A-fib.  Last dose was 1 week ago.  No relief with the last left knee cortisone injection in December.  After undergoing a shared decision-making process they agreed to proceed with left total knee arthroplasty.  Surgical consent form was signed.    Description of procedure: The patient was seen in the preoperative holding area and the left knee was signed to confirm the correct operative site.  They were seen by anesthesia.  They received Ancef 2 g IV prophylactic  antibiotics within 1 hour of incision time.  They were brought back to the operating room.  Spinal was performed without difficulty and they were given sedation throughout the case.  Patient placed in the supine position and all bony prominences were well-padded.  Nonsterile tourniquet was applied to the thigh.  The left lower extremity was prepped and draped in the usual sterile fashion and timeout was performed to confirm left total knee arthroplasty for patient Lesvia Madera.    The left lower extremity was exsanguinated with an Esmarch.  Tourniquet was inflated to 300 mmHg.  With the knee flexed a midline incision was made with a 10 blade scalpel.  Adequate hemostasis maintained with Bovie electrocautery.  Full-thickness medial and lateral flaps were elevated.  Using a fresh 10 blade scalpel I made a medial parapatellar arthrotomy.  The patella was everted.  Patella fat pad and anterior femoral fat pads were excised.  Marginal osteophytes were removed.  Medial release was performed for this varus knee using Bovie electrocautery.  Z retractors were placed medially and laterally.    Guide pins for the CORI were placed in the medial tibia shaft and distal medial femur. Sensors were placed and data capture was performed per standard CORI computer guidance technique.  Femoral and tibial sizes were determined.    The distal femoral cut was then made with a dominguez at the previously selected depth and amount of valgus (3-5 degrees). The 4-in-1 cutting guide for the previously selected femur size of 7 was pinned in place at the appropriate amount of external rotation.  Anterior and posterior cuts were made as were the chamfer cuts.  Cut bone was removed.  We then turned our attention to the tibia.    The tibia was subluxed anteriorly. PCL retractor was placed as were medial and lateral Hohmann retractors.  The tibial cutting guide was then pinned in place using CORI guidance for the proximal tibial cut. We then used the  oscillating saw to make the proximal tibial cut and this cut was then checked with the CORI. Medial and lateral menisci were then excised as were posterior osteophytes.    Flexion and extension gaps were then checked and with a 10 mm block we achieved full extension and flexion with excellent alignment. The tibia was sized to a 5 tibial tray centered off the medial third of the tibial tubercle.  The tray was pinned in place.  We then impacted the tibial fins.  Size 7  trial femur was then placed and box cut was made with the reamer and punch. We trialed with a size 10 polyethylene, 7 femur and 5 tibia.  With these trial components in place we achieved full extension and flexion with excellent alignment and stable throughout.     We then turned our attention to the patella.  Patella was sized to 24 mm in thickness and we made a 9 mm patellar cut with the reciprocal saw.  Patella drill holes were made with the appropriate size guide.  A 32 trial button was placed and there was excellent tracking with the no thumbs technique.    Trial components were then removed.  Final components were opened on the back table.  Posterior capsule was injected with 20 mL of half percent ropivacaine and 5 mg of morphine.  Cement was mixed on the back table.  The knee was copiously irrigated with Pulsavac lavage.  The knee was thoroughly dried.  Cement was then applied to the tibia and final size 5 tibial tray was impacted in place and excess cement was removed.  Cement was applied to the femur and final size 7 femur was impacted in place and excess cement removed.  We then placed a 10 mm polyethylene-this was seen to be fully seated in the tibial tray.  Knee was then placed in extension and we applied cement to the cut patellar surface and 32 patella was held in place with patellar clamp.  All excess cement was removed.  The knee was left in extension while cement cured.  While the cement cured we removed the CORI tibial and femoral  pins and sensors.  Tibial pin incisions were closed with 3-0 monocryl.    Once the cement was fully cured we irrigated the knee with diluted Betadine solution and Pulsavac lavage.  The knee was taken through full range of motion and seen to achieve full extension and flexion with excellent patellar tracking.    We then proceeded with closure.  The deep fascia was closed with a #1 Stratafix barbed suture.  Dermis was closed with 2-0 Vicryl.  Skin was closed with a running 3-0 Stratafix barbed subcuticular suture.    A sterile dressing was then applied with mesh dressing and glue.  We then applied 4x4's, ABDs, soft roll and a six-inch Ace bandage.    Tourniquet was deflated at 83 minutes.  Patient was transferred to recovery in stable condition.  All sponge and needle counts were correct ×2.  Patient received first dose of tranexamic acid just prior to incision and the second dose 5-10 minutes prior to letting down the tourniquet to minimize bleeding.    Postoperative plan:  Patient will be admitted to Dr. WOOTEN for medical management  Mobilize starting today with PT/OT as tolerated  Start Eliquis for DVT prophylaxis tomorrow   consult for home physical therapy and home equipment needs  Follow-up with me in 2-3 weeks.    Bar Olivera MD  05/09/24  12:07 EDT    CC: Dr. Luigi Mckeon

## 2024-05-09 NOTE — PLAN OF CARE
Goal Outcome Evaluation:  Plan of Care Reviewed With: patient           Outcome Evaluation: VSS. Alert oriented x4. Ambulates assist x1. Voiding well.

## 2024-05-09 NOTE — THERAPY EVALUATION
Patient Name: Lesvia Madera  : 1945    MRN: [id number]                              Today's Date: 2024       Admit Date: 2024    Visit Dx: No diagnosis found.  Patient Active Problem List   Diagnosis    Primary localized osteoarthritis of left knee    Status post left knee replacement    Hypertension    A-fib    Diabetes     Past Medical History:   Diagnosis Date    Arthritis     BILAT HANDS    Atrial fibrillation     Diabetes mellitus     Hypertension     Tailbone injury     PT STATES ITS CROOKED A BIT    Wears glasses     READERS     Past Surgical History:   Procedure Laterality Date    ANKLE SURGERY Right     PINS AND PLATES PLACED THEN REMOVED - NO HARDWARE IN PLACE NOW    BREAST BIOPSY Right     X1- NO MARKER    CATARACT EXTRACTION, BILATERAL Bilateral     WISDOM TOOTH EXTRACTION        General Information       Row Name 24          Physical Therapy Time and Intention    Document Type evaluation  -     Mode of Treatment physical therapy  -       Row Name 24          General Information    Patient Profile Reviewed yes  -     Prior Level of Function min assist:;all household mobility;community mobility;gait;transfer;bed mobility;ADL's  -     Existing Precautions/Restrictions fall;other (see comments)  adductor canal nerve cath  -     Barriers to Rehab none identified  -       Row Name 24          Living Environment    People in Home alone;other (see comments)  no support at home  -       Row Name 24          Home Main Entrance    Number of Stairs, Main Entrance one  -       Row Name 24          Stairs Within Home, Primary    Stairs, Within Home, Primary all needs met on main level  -     Number of Stairs, Within Home, Primary twelve  -       Row Name 24          Cognition    Orientation Status (Cognition) oriented x 3  -       Row Name 24          Safety Issues, Functional Mobility    Safety  Issues Affecting Function (Mobility) awareness of need for assistance;insight into deficits/self-awareness  -     Impairments Affecting Function (Mobility) balance;endurance/activity tolerance;pain;range of motion (ROM);strength  -               User Key  (r) = Recorded By, (t) = Taken By, (c) = Cosigned By      Initials Name Provider Type    HW Dora Fitzgerald, ESTELLE Physical Therapist                   Mobility       Row Name 05/09/24 1820          Bed Mobility    Comment, (Bed Mobility) Pt UIC  -       Row Name 05/09/24 1820          Transfers    Comment, (Transfers) VC for hand placement and line management  -       Row Name 05/09/24 1820          Sit-Stand Transfer    Sit-Stand [place] (Transfers) contact guard;nonverbal cues (demo/gesture);verbal cues;2 person assist  -     Assistive Device (Sit-Stand Transfers) walker, front-wheeled  -       Row Name 05/09/24 1820 05/09/24 1709       Gait/Stairs (Locomotion)    [place] Level (Gait) contact guard;nonverbal cues (demo/gesture);verbal cues;2 person assist  -HW --    Assistive Device (Gait) walker, front-wheeled  - --    Patient was able to Ambulate yes  -HW yes  -HW    Distance in Feet (Gait) 80  -HW 80  -HW    Deviations/Abnormal Patterns (Gait) bilateral deviations;base of support, wide;weight shifting decreased;stride length decreased;gait speed decreased  -HW --    Bilateral Gait Deviations forward flexed posture;heel strike decreased  -HW --    Comment, (Gait/Stairs) Pt amb in the [place] with step-to gait pattern. Pt demonstrated forward flexed posture, heavy reliance on FWW to offload LLE, slow gait speed, and decreased stride length. Fair improvement with VC. Activity limited by elevated pain. No knee buckling or LOB observed.  -HW --      Row Name 05/09/24 1820          Mobility    Extremity Weight-bearing Status left lower extremity  -     Left Lower Extremity (Weight-bearing Status) weight-bearing as tolerated (WBAT)  -                User Key  (r) = Recorded By, (t) = Taken By, (c) = Cosigned By      Initials Name Provider Type     Dora Fitzgerald PT Physical Therapist                   Obj/Interventions       [facility] Name 05/09/24 1822          Range of Motion Comprehensive    General Range of Motion lower extremity range of motion deficits identified  -     Comment, General Range of Motion Surgical knee ROM: 13-74  -[facility] Name 05/09/24 1822          Strength Comprehensive (MMT)    General Manual Muscle Testing (MMT) Assessment lower extremity strength deficits identified  -     Comment, General Manual Muscle Testing (MMT) Assessment Pt able to perform B active ankle DF and SLR  -[facility] Name 05/09/24 1822          Motor Skills    Therapeutic Exercise knee;ankle  -[facility] Name 05/09/24 1822          Knee (Therapeutic Exercise)    Knee (Therapeutic Exercise) isometric exercises;strengthening exercise  -     Knee Isometrics (Therapeutic Exercise) quad sets;left;10 repetitions  -     Knee Strengthening (Therapeutic Exercise) SLR (straight leg raise);SAQ (short arc quad);LAQ (long arc quad);heel slides;left;3 repetitions  -[facility] Name 05/09/24 1822          Ankle (Therapeutic Exercise)    Ankle (Therapeutic Exercise) AROM (active range of motion)  -     Ankle AROM (Therapeutic Exercise) bilateral;dorsiflexion;plantarflexion;10 repetitions  -[facility] Name 05/09/24 1822          Balance    Balance Assessment sitting static balance;sitting dynamic balance;sit to stand dynamic balance;standing static balance;standing dynamic balance  -     Static Sitting Balance standby assist  -     Dynamic Sitting Balance standby assist  -     Position, Sitting Balance sitting edge of bed  -     Static Standing Balance contact guard  -     Dynamic Standing Balance contact guard  -     Position/Device Used, Standing Balance supported;walker, rolling  -     Balance Interventions sitting;standing;sit to  stand;occupation based/functional task  -       Row Name 05/09/24 1822          Sensory Assessment (Somatosensory)    Sensory Assessment (Somatosensory) LE sensation intact  -               User Key  (r) = Recorded By, (t) = Taken By, (c) = Cosigned By      Initials Name Provider Type    HW Dora Fitzgerald PT Physical Therapist                   Goals/Plan       [facility] Name 05/09/24 1825          Bed Mobility Goal 1 (PT)    Activity/Assistive Device (Bed Mobility Goal 1, PT) sit to supine/supine to sit  -HW     [place] Level/Cues Needed (Bed Mobility Goal 1, PT) modified independence  -HW     Time Frame (Bed Mobility Goal 1, PT) long term goal (LTG);3 days  -[facility] Name 05/09/24 1825          Transfer Goal 1 (PT)    Activity/Assistive Device (Transfer Goal 1, PT) sit-to-stand/stand-to-sit  -HW     [place] Level/Cues Needed (Transfer Goal 1, PT) modified independence  -HW     Time Frame (Transfer Goal 1, PT) long term goal (LTG);3 days  -[facility] Name 05/09/24 1825          Gait Training Goal 1 (PT)    Activity/Assistive Device (Gait Training Goal 1, PT) gait (walking locomotion)  -     [place] Level (Gait Training Goal 1, PT) modified independence  -HW     Distance (Gait Training Goal 1, PT) 150  -HW     Time Frame (Gait Training Goal 1, PT) long term goal (LTG);3 days  -[facility] Name 05/09/24 1825          ROM Goal 1 (PT)    ROM Goal 1 (PT) Surgical Knee ROM: 0-90  -HW     Time Frame (ROM Goal 1, PT) long-term goal (LTG);3 days  -[facility] Name 05/09/24 1825          Therapy Assessment/Plan (PT)    Planned Therapy Interventions (PT) balance training;bed mobility training;gait training;home exercise program;ROM (range of motion);patient/family education;stair training;strengthening;stretching;transfer training  -               User Key  (r) = Recorded By, (t) = Taken By, (c) = Cosigned By      Initials Name Provider Type    HW Dora Fitzgerald PT Physical Therapist                    Clinical Impression       Row Name 05/09/24 1823          Pain    Pretreatment Pain Rating 4/10  -     Posttreatment Pain Rating 4/10  -     Pain Location - Side/Orientation Left  -     Pain Location generalized  -     Pain Location - knee  -     Pain Intervention(s) Ambulation/increased activity;Repositioned;Elevated;Cold applied  -[facility] Name 05/09/24 1823          Plan of Care Review    Plan of Care Reviewed With patient  -     Progress no change  -     Outcome Evaluation Pt amb 80' with FWW and CGAx2. Step-to gait pattern observed. Activity limited by elevated pain. No knee buckling or LOB noted. HEP and precautions reviewed with pt. Additional ambulation tonight with nsg encouraged. PADD score = 3. Recommend d/c to IRF to promote increased independence with functional mobility and decreased risk for falls prior to returning home with limited support.  -HW       Row Name 05/09/24 1823          Therapy Assessment/Plan (PT)    Rehab Potential (PT) good, to achieve stated therapy goals  -     Criteria for Skilled Interventions Met (PT) yes;meets criteria;skilled treatment is necessary  -     Therapy Frequency (PT) 2 times/day  -HW       Row Name 05/09/24 1823          Vital Signs    Pre Patient Position Sitting  -     Intra Patient Position Standing  -     Post Patient Position Sitting  -HW       Row Name 05/09/24 1823          Positioning and Restraints    Pre-Treatment Position sitting in chair/recliner  -     Post Treatment Position chair  -     In Chair notified nsg;reclined;sitting;encouraged to call for assist;call light within reach;exit alarm on;legs elevated;compression device  -               User Key  (r) = Recorded By, (t) = Taken By, (c) = Cosigned By      Initials Name Provider Type     Dora Fitzgerald, PT Physical Therapist                   Outcome Measures       [facility] Name 05/09/24 1823 05/09/24 1518       How much help from another person do you currently need...     Turning from your back to your side while in flat bed without using bedrails? 3  -HW 3  -BC    Moving from lying on back to sitting on the side of a flat bed without bedrails? 3  -HW 3  -BC    Moving to and from a bed to a chair (including a wheelchair)? 3  -HW 3  -BC    Standing up from a chair using your arms (e.g., wheelchair, bedside chair)? 3  -HW 3  -BC    Climbing 3-5 steps with a railing? 2  -HW 2  -BC    To walk in hospital room? 3  -HW 2  -BC    AM-PAC 6 Clicks Score (PT) 17  -HW 16  -BC    Highest Level of Mobility Goal 5 --> Static standing  - 5 --> Static standing  -BC      Row Name 05/09/24 1823          PADD    Diagnosis 1  -     Gender 1  -     Age Group 1  -HW     Gait Distance 0  -HW     Assist Level 1  -HW     Home Support 0  -HW     PADD Score 3  -HW     Patient Preference extended rehabilitation  -     Prediction by PADD Score extended rehabilitation  -       Row Name 05/09/24 1823          Functional Assessment    Outcome Measure Options AM-PAC 6 Clicks Basic Mobility (PT);PADD  -               User Key  (r) = Recorded By, (t) = Taken By, (c) = Cosigned By      Initials Name Provider Type    BC Denise Hull, RN Registered Nurse     Dora Fitzgerald, ESTELLE Physical Therapist                                 Physical Therapy Education       Title: PT OT SLP Therapies (Done)       Topic: Physical Therapy (Done)       Point: Mobility training (Done)       Learning Progress Summary             Patient Acceptance, E,D, VU,DU by  at 5/9/2024 1825                         Point: Home exercise program (Done)       Learning Progress Summary             Patient Acceptance, E,D, VU,DU by  at 5/9/2024 1825                         Point: Body mechanics (Done)       Learning Progress Summary             Patient Acceptance, E,D, VU,DU by  at 5/9/2024 1825                         Point: Precautions (Done)       Learning Progress Summary             Patient Acceptance, E,D, VU,DU by  at 5/9/2024  1825                                         User Key       Initials Effective Dates Name Provider Type Discipline     12/15/23 -  Dora Fitzgerald PT Physical Therapist PT                  PT Recommendation and Plan  Planned Therapy Interventions (PT): balance training, bed mobility training, gait training, home exercise program, ROM (range of motion), patient/family education, stair training, strengthening, stretching, transfer training  Plan of Care Reviewed With: patient  Progress: no change  Outcome Evaluation: Pt amb 80' with FWW and CGAx2. Step-to gait pattern observed. Activity limited by elevated pain. No knee buckling or LOB noted. HEP and precautions reviewed with pt. Additional ambulation tonight with nsg encouraged. PADD score = 3. Recommend d/c to IRF to promote increased independence with functional mobility and decreased risk for falls prior to returning home with limited support.     Time Calculation:   PT Evaluation Complexity  History, PT Evaluation Complexity: 1-2 personal factors and/or comorbidities  Examination of Body Systems (PT Eval Complexity): total of 3 or more elements  Clinical Presentation (PT Evaluation Complexity): evolving  Clinical Decision Making (PT Evaluation Complexity): moderate complexity  Overall Complexity (PT Evaluation Complexity): moderate complexity     PT Charges       Row Name 05/09/24 1827             Time Calculation    Start Time 1645  -HW      PT Received On 05/09/24  -      PT Goal Re-Cert Due Date 05/19/24  -         Timed Charges    97781 - Gait Training Minutes  10  -HW         Untimed Charges    PT Eval/Re-eval Minutes 46  -HW         Total Minutes    Timed Charges Total Minutes 10  -HW      Untimed Charges Total Minutes 46  -       Total Minutes 56  -HW                User Key  (r) = Recorded By, (t) = Taken By, (c) = Cosigned By      Initials Name Provider Type     Dora Fitzgerald PT Physical Therapist                  Therapy Charges for Today        Code Description Service Date Service Provider Modifiers Qty    53918477768 HC GAIT TRAINING EA 15 MIN 5/9/2024 Dora Fitzgerald, PT GP 1    36360042915 HC PT THER SUPP EA 15 MIN 5/9/2024 Dora Fitzgerald, PT GP 3    64307815105 HC PT EVAL MOD COMPLEXITY 4 5/9/2024 Dora Fitzgerald, PT GP 1            PT G-Codes  Outcome Measure Options: AM-PAC 6 Clicks Basic Mobility (PT), PADD  AM-PAC 6 Clicks Score (PT): 17  PT Discharge Summary  Anticipated Discharge Disposition (PT): inpatient rehabilitation facility    Dora Fitzgerald PT  5/9/2024

## 2024-05-10 LAB
ANION GAP SERPL CALCULATED.3IONS-SCNC: 15 MMOL/L (ref 5–15)
BASOPHILS # BLD AUTO: 0.02 10*3/MM3 (ref 0–0.2)
BASOPHILS NFR BLD AUTO: 0.2 % (ref 0–1.5)
BUN SERPL-MCNC: 11 MG/DL (ref 8–23)
BUN/CREAT SERPL: 12.8 (ref 7–25)
CALCIUM SPEC-SCNC: 6.7 MG/DL (ref 8.6–10.5)
CHLORIDE SERPL-SCNC: 110 MMOL/L (ref 98–107)
CO2 SERPL-SCNC: 19 MMOL/L (ref 22–29)
CREAT SERPL-MCNC: 0.86 MG/DL (ref 0.57–1)
DEPRECATED RDW RBC AUTO: 50.4 FL (ref 37–54)
EGFRCR SERPLBLD CKD-EPI 2021: 69.7 ML/MIN/1.73
EOSINOPHIL # BLD AUTO: 0 10*3/MM3 (ref 0–0.4)
EOSINOPHIL NFR BLD AUTO: 0 % (ref 0.3–6.2)
ERYTHROCYTE [DISTWIDTH] IN BLOOD BY AUTOMATED COUNT: 13.3 % (ref 12.3–15.4)
GLUCOSE BLDC GLUCOMTR-MCNC: 112 MG/DL (ref 70–130)
GLUCOSE BLDC GLUCOMTR-MCNC: 142 MG/DL (ref 70–130)
GLUCOSE BLDC GLUCOMTR-MCNC: 150 MG/DL (ref 70–130)
GLUCOSE BLDC GLUCOMTR-MCNC: 99 MG/DL (ref 70–130)
GLUCOSE SERPL-MCNC: 124 MG/DL (ref 65–99)
HCT VFR BLD AUTO: 38.5 % (ref 34–46.6)
HGB BLD-MCNC: 11.8 G/DL (ref 12–15.9)
IMM GRANULOCYTES # BLD AUTO: 0.06 10*3/MM3 (ref 0–0.05)
IMM GRANULOCYTES NFR BLD AUTO: 0.5 % (ref 0–0.5)
LYMPHOCYTES # BLD AUTO: 1.17 10*3/MM3 (ref 0.7–3.1)
LYMPHOCYTES NFR BLD AUTO: 9.6 % (ref 19.6–45.3)
MCH RBC QN AUTO: 31.2 PG (ref 26.6–33)
MCHC RBC AUTO-ENTMCNC: 30.6 G/DL (ref 31.5–35.7)
MCV RBC AUTO: 101.9 FL (ref 79–97)
MONOCYTES # BLD AUTO: 1.27 10*3/MM3 (ref 0.1–0.9)
MONOCYTES NFR BLD AUTO: 10.5 % (ref 5–12)
NEUTROPHILS NFR BLD AUTO: 79.2 % (ref 42.7–76)
NEUTROPHILS NFR BLD AUTO: 9.62 10*3/MM3 (ref 1.7–7)
NRBC BLD AUTO-RTO: 0 /100 WBC (ref 0–0.2)
PLATELET # BLD AUTO: 158 10*3/MM3 (ref 140–450)
PMV BLD AUTO: 8.8 FL (ref 6–12)
POTASSIUM SERPL-SCNC: 3.5 MMOL/L (ref 3.5–5.2)
RBC # BLD AUTO: 3.78 10*6/MM3 (ref 3.77–5.28)
SODIUM SERPL-SCNC: 144 MMOL/L (ref 136–145)
WBC NRBC COR # BLD AUTO: 12.14 10*3/MM3 (ref 3.4–10.8)

## 2024-05-10 PROCEDURE — 63710000001 ATORVASTATIN 40 MG TABLET: Performed by: NURSE PRACTITIONER

## 2024-05-10 PROCEDURE — 97535 SELF CARE MNGMENT TRAINING: CPT

## 2024-05-10 PROCEDURE — 85025 COMPLETE CBC W/AUTO DIFF WBC: CPT | Performed by: ORTHOPAEDIC SURGERY

## 2024-05-10 PROCEDURE — A9270 NON-COVERED ITEM OR SERVICE: HCPCS | Performed by: ORTHOPAEDIC SURGERY

## 2024-05-10 PROCEDURE — 63710000001 DULOXETINE 60 MG CAPSULE DELAYED-RELEASE PARTICLES: Performed by: NURSE PRACTITIONER

## 2024-05-10 PROCEDURE — 25010000002 CEFAZOLIN PER 500 MG: Performed by: ORTHOPAEDIC SURGERY

## 2024-05-10 PROCEDURE — 63710000001 ACETAMINOPHEN EXTRA STRENGTH 500 MG TABLET: Performed by: ORTHOPAEDIC SURGERY

## 2024-05-10 PROCEDURE — 97530 THERAPEUTIC ACTIVITIES: CPT

## 2024-05-10 PROCEDURE — A9270 NON-COVERED ITEM OR SERVICE: HCPCS | Performed by: NURSE PRACTITIONER

## 2024-05-10 PROCEDURE — 97116 GAIT TRAINING THERAPY: CPT

## 2024-05-10 PROCEDURE — 63710000001 OXYCODONE 10 MG TABLET: Performed by: ORTHOPAEDIC SURGERY

## 2024-05-10 PROCEDURE — 63710000001 INSULIN LISPRO (HUMAN) PER 5 UNITS: Performed by: NURSE PRACTITIONER

## 2024-05-10 PROCEDURE — 63710000001 APIXABAN 2.5 MG TABLET: Performed by: ORTHOPAEDIC SURGERY

## 2024-05-10 PROCEDURE — 63710000001 POTASSIUM CHLORIDE 10 MEQ CAPSULE CONTROLLED-RELEASE: Performed by: INTERNAL MEDICINE

## 2024-05-10 PROCEDURE — 63710000001 DIGOXIN 125 MCG TABLET: Performed by: NURSE PRACTITIONER

## 2024-05-10 PROCEDURE — 25810000003 SODIUM CHLORIDE 0.9 % SOLUTION: Performed by: ORTHOPAEDIC SURGERY

## 2024-05-10 PROCEDURE — 97165 OT EVAL LOW COMPLEX 30 MIN: CPT

## 2024-05-10 PROCEDURE — 25010000002 CALCIUM GLUCONATE-NACL 1-0.675 GM/50ML-% SOLUTION: Performed by: INTERNAL MEDICINE

## 2024-05-10 PROCEDURE — A9270 NON-COVERED ITEM OR SERVICE: HCPCS | Performed by: INTERNAL MEDICINE

## 2024-05-10 PROCEDURE — 82948 REAGENT STRIP/BLOOD GLUCOSE: CPT

## 2024-05-10 PROCEDURE — 63710000001 OXYCODONE 5 MG TABLET: Performed by: ORTHOPAEDIC SURGERY

## 2024-05-10 PROCEDURE — 63710000001 HYDROXYZINE 25 MG TABLET: Performed by: NURSE PRACTITIONER

## 2024-05-10 PROCEDURE — 80048 BASIC METABOLIC PNL TOTAL CA: CPT | Performed by: ORTHOPAEDIC SURGERY

## 2024-05-10 PROCEDURE — 97110 THERAPEUTIC EXERCISES: CPT

## 2024-05-10 RX ORDER — POTASSIUM CHLORIDE 750 MG/1
40 CAPSULE, EXTENDED RELEASE ORAL ONCE
Status: COMPLETED | OUTPATIENT
Start: 2024-05-10 | End: 2024-05-10

## 2024-05-10 RX ORDER — CALCIUM GLUCONATE 20 MG/ML
1000 INJECTION, SOLUTION INTRAVENOUS ONCE
Status: COMPLETED | OUTPATIENT
Start: 2024-05-10 | End: 2024-05-10

## 2024-05-10 RX ADMIN — SODIUM CHLORIDE 100 ML/HR: 9 INJECTION, SOLUTION INTRAVENOUS at 04:49

## 2024-05-10 RX ADMIN — ACETAMINOPHEN 1000 MG: 500 TABLET ORAL at 19:39

## 2024-05-10 RX ADMIN — APIXABAN 2.5 MG: 2.5 TABLET, FILM COATED ORAL at 19:40

## 2024-05-10 RX ADMIN — DIGOXIN 125 MCG: 125 TABLET ORAL at 12:09

## 2024-05-10 RX ADMIN — SODIUM CHLORIDE 2 G: 900 INJECTION INTRAVENOUS at 02:33

## 2024-05-10 RX ADMIN — ACETAMINOPHEN 1000 MG: 500 TABLET ORAL at 04:48

## 2024-05-10 RX ADMIN — ACETAMINOPHEN 1000 MG: 500 TABLET ORAL at 08:56

## 2024-05-10 RX ADMIN — DULOXETINE HYDROCHLORIDE 60 MG: 60 CAPSULE, DELAYED RELEASE ORAL at 08:56

## 2024-05-10 RX ADMIN — POTASSIUM CHLORIDE 40 MEQ: 750 CAPSULE, EXTENDED RELEASE ORAL at 13:24

## 2024-05-10 RX ADMIN — OXYCODONE HYDROCHLORIDE 5 MG: 5 TABLET ORAL at 15:47

## 2024-05-10 RX ADMIN — OXYCODONE HYDROCHLORIDE 10 MG: 10 TABLET ORAL at 23:54

## 2024-05-10 RX ADMIN — APIXABAN 2.5 MG: 2.5 TABLET, FILM COATED ORAL at 08:56

## 2024-05-10 RX ADMIN — ATORVASTATIN CALCIUM 40 MG: 40 TABLET, FILM COATED ORAL at 19:39

## 2024-05-10 RX ADMIN — INSULIN LISPRO 2 UNITS: 100 INJECTION, SOLUTION INTRAVENOUS; SUBCUTANEOUS at 08:53

## 2024-05-10 RX ADMIN — CALCIUM GLUCONATE 1000 MG: 20 INJECTION, SOLUTION INTRAVENOUS at 13:24

## 2024-05-10 RX ADMIN — HYDROXYZINE HYDROCHLORIDE 25 MG: 25 TABLET, FILM COATED ORAL at 19:39

## 2024-05-10 NOTE — PLAN OF CARE
Goal Outcome Evaluation:  Plan of Care Reviewed With: patient        Progress: improving  Outcome Evaluation: Pt with excellent effort and increased ambulation distance to 190' with CGA and RW. No overt LOB or knee buckling. Pt continues to present below her functional baseline with decreased L knee ROM/strength, acute pain, and decreased endurance. Pt reports she will have assist as needed at home. Based on improved functional performance PT is updating d/c recs to home with assist and HHPT.      Anticipated Discharge Disposition (PT): home with assist, home with home health

## 2024-05-10 NOTE — THERAPY TREATMENT NOTE
Patient Name: Lesvia Madera  : 1945    MRN: [id number]                              Today's Date: 5/10/2024       Admit Date: 2024    Visit Dx:     ICD-10-CM ICD-9-CM   1. Primary localized osteoarthritis of left knee  M17.12 715.16   2. Status post left knee replacement  Z96.652 V43.65     Patient Active Problem List   Diagnosis    Primary localized osteoarthritis of left knee    Status post left knee replacement    Hypertension    A-fib    Diabetes     Past Medical History:   Diagnosis Date    Arthritis     BILAT HANDS    Atrial fibrillation     Diabetes mellitus     Hypertension     Tailbone injury     PT STATES ITS CROOKED A BIT    Wears glasses     READERS     Past Surgical History:   Procedure Laterality Date    ANKLE SURGERY Right     PINS AND PLATES PLACED THEN REMOVED - NO HARDWARE IN PLACE NOW    BREAST BIOPSY Right     X1- NO MARKER    CATARACT EXTRACTION, BILATERAL Bilateral     TOTAL KNEE ARTHROPLASTY Left 2024    Procedure: TOTAL KNEE ARTHROPLASTY WITH CORI ROBOT - LEFT;  Surgeon: Bar Olivera MD;  Location: [facility];  Service: Robotics - Ortho;  Laterality: Left;    WISDOM TOOTH EXTRACTION        General Information       Row Name 05/10/24 1024          Physical Therapy Time and Intention    Document Type therapy note (daily note)  -AB     Mode of Treatment physical therapy  -AB       Row Name 05/10/24 1024          General Information    Patient Profile Reviewed yes  -AB     Existing Precautions/Restrictions other (see comments);fall  adductor canal nerve cath  -AB     Barriers to Rehab none identified  -AB       Row Name 05/10/24 1024          Living Environment    People in Home --  Pt reports she has hired someone to stay at night at d/c and friends to provide assist during the day.  -AB       Row Name 05/10/24 1024          Cognition    Orientation Status (Cognition) oriented x 3  -AB       Row Name 05/10/24 1024          Safety Issues, Functional Mobility     Safety Issues Affecting Function (Mobility) insight into deficits/self-awareness;safety precaution awareness;safety precautions follow-through/compliance  -AB     Impairments Affecting Function (Mobility) balance;endurance/activity tolerance;pain;range of motion (ROM);strength  -AB               User Key  (r) = Recorded By, (t) = Taken By, (c) = Cosigned By      Initials Name Provider Type    AB Mercedes Shaw PT Physical Therapist                   Mobility       Row Name 05/10/24 1039          Bed Mobility    Bed Mobility supine-sit;scooting/bridging  -AB     Scooting/Bridging Rankin (Bed Mobility) contact guard  -AB     Supine-Sit Rankin (Bed Mobility) contact guard  -AB     Assistive Device (Bed Mobility) head of bed elevated  -AB     Comment, (Bed Mobility) assist for line management  -AB       Row Name 05/10/24 1039          Transfers    Comment, (Transfers) Cues for hand placement and sequencing. Denied dizziness throughout.  -AB       Row Name 05/10/24 1039          Sit-Stand Transfer    Sit-Stand Rankin (Transfers) verbal cues;1 person assist;contact guard  -AB     Assistive Device (Sit-Stand Transfers) walker, front-wheeled  -AB     Comment, (Sit-Stand Transfer) Cues to advance LLE prior to stand for comfort.  -AB       Row Name 05/10/24 1039          Gait/Stairs (Locomotion)    Rankin Level (Gait) contact guard;verbal cues;1 person assist  -AB     Assistive Device (Gait) walker, front-wheeled  -AB     Patient was able to Ambulate yes  -AB     Distance in Feet (Gait) 190  -AB     Deviations/Abnormal Patterns (Gait) bilateral deviations;base of support, wide;weight shifting decreased;stride length decreased;gait speed decreased  -AB     Bilateral Gait Deviations forward flexed posture;heel strike decreased  -AB     Comment, (Gait/Stairs) Pt ambulated with step through gait pattern, slowed valentín, and decreased weight acceptance onto LLE. Cues for upright posture, improved heel  strike, and keeping walker closer to body. No overt LOB or knee buckling. Gait mechanics improved with cues.  -AB       Row Name 05/10/24 1039          Mobility    Extremity Weight-bearing Status left lower extremity  -AB     Left Lower Extremity (Weight-bearing Status) weight-bearing as tolerated (WBAT)  -AB               User Key  (r) = Recorded By, (t) = Taken By, (c) = Cosigned By      Initials Name Provider Type    AB Mercedes Shaw, PT Physical Therapist                   Obj/Interventions       Row Name 05/10/24 1049          Range of Motion Comprehensive    General Range of Motion lower extremity range of motion deficits identified  -AB     Comment, General Range of Motion L knee AROM 10-75  -AB       Row Name 05/10/24 1049          Motor Skills    Therapeutic Exercise knee;ankle  -AB       Row Name 05/10/24 1049          Knee (Therapeutic Exercise)    Knee Isometrics (Therapeutic Exercise) left;quad sets;10 repetitions  -AB     Knee Strengthening (Therapeutic Exercise) left;SLR (straight leg raise);SAQ (short arc quad);LAQ (long arc quad);heel slides;10 repetitions  -AB       Row Name 05/10/24 1049          Ankle (Therapeutic Exercise)    Ankle (Therapeutic Exercise) AROM (active range of motion)  -AB     Ankle AROM (Therapeutic Exercise) bilateral;dorsiflexion;plantarflexion;10 repetitions  -AB       Row Name 05/10/24 1049          Balance    Balance Assessment sitting static balance;sitting dynamic balance;standing static balance;standing dynamic balance  -AB     Static Sitting Balance standby assist  -AB     Dynamic Sitting Balance standby assist  -AB     Position, Sitting Balance unsupported;sitting edge of bed  -AB     Static Standing Balance contact guard  -AB     Dynamic Standing Balance contact guard;1-person assist;verbal cues  -AB     Position/Device Used, Standing Balance supported;walker, front-wheeled  -AB     Balance Interventions sitting;standing;sit to  stand;supported;static;dynamic;occupation based/functional task  -AB     Comment, Balance No overt LOB or knee buckling  -AB               User Key  (r) = Recorded By, (t) = Taken By, (c) = Cosigned By      Initials Name Provider Type    AB Mercedes Shaw, PT Physical Therapist                   Goals/Plan    No documentation.                  Clinical Impression       Row Name 05/10/24 1054          Pain    Pretreatment Pain Rating 5/10  -AB     Posttreatment Pain Rating 5/10  -AB     Pain Location - Side/Orientation Left  -AB     Pain Location anterior  -AB     Pain Location - knee  -AB     Pre/Posttreatment Pain Comment tolerated.  -AB     Pain Intervention(s) Ambulation/increased activity;Elevated;Repositioned;Cold applied;Nursing Notified  -AB     Additional Documentation Pain Scale: Numbers Pre/Post-Treatment (Group)  -AB       Row Name 05/10/24 1054          Plan of Care Review    Plan of Care Reviewed With patient  -AB     Progress improving  -AB     Outcome Evaluation Pt with excellent effort and increased ambulation distance to 190' with CGA and RW. No overt LOB or knee buckling. Pt continues to present below her functional baseline with decreased L knee ROM/strength, acute pain, and decreased endurance. Pt reports she will have assist as needed at home. Based on improved functional performance PT is updating d/c recs to home with assist and HHPT.  -AB       Row Name 05/10/24 1054          Vital Signs    Pre Systolic BP Rehab 105  -AB     Pre Treatment Diastolic BP 88  -AB     Post Systolic BP Rehab 151  -AB     Post Treatment Diastolic BP 46  -AB     Pre SpO2 (%) 91  -AB     O2 Delivery Pre Treatment room air  -AB     O2 Delivery Intra Treatment room air  -AB     Post SpO2 (%) 96  -AB     O2 Delivery Post Treatment room air  -AB     Pre Patient Position Supine  -AB     Intra Patient Position Standing  -AB     Post Patient Position Sitting  -AB       Row Name 05/10/24 1054          Positioning and  Restraints    Pre-Treatment Position in bed  -AB     Post Treatment Position chair  -AB     In Chair notified nsg;reclined;sitting;call light within reach;exit alarm on;encouraged to call for assist;legs elevated;waffle cushion;compression device;with nsg  -AB               User Key  (r) = Recorded By, (t) = Taken By, (c) = Cosigned By      Initials Name Provider Type    Mercedes Kiran, PT Physical Therapist                   Outcome Measures       Row Name 05/10/24 1100 05/10/24 0845       How much help from another person do you currently need...    Turning from your back to your side while in flat bed without using bedrails? 4  -AB 3  -BM    Moving from lying on back to sitting on the side of a flat bed without bedrails? 3  -AB 3  -BM    Moving to and from a bed to a chair (including a wheelchair)? 3  -AB 3  -BM    Standing up from a chair using your arms (e.g., wheelchair, bedside chair)? 3  -AB 3  -BM    Climbing 3-5 steps with a railing? 3  -AB 3  -BM    To walk in hospital room? 3  -AB 3  -BM    AM-PAC 6 Clicks Score (PT) 19  -AB 18  -BM    Highest Level of Mobility Goal 6 --> Walk 10 steps or more  -AB 6 --> Walk 10 steps or more  -BM      Row Name 05/10/24 1100          Functional Assessment    Outcome Measure Options AM-PAC 6 Clicks Basic Mobility (PT)  -AB               User Key  (r) = Recorded By, (t) = Taken By, (c) = Cosigned By      Initials Name Provider Type    Mercedes Kiran, PT Physical Therapist    Gayla Anaya, RN Registered Nurse                                 Physical Therapy Education       Title: PT OT SLP Therapies (Done)       Topic: Physical Therapy (Done)       Point: Mobility training (Done)       Learning Progress Summary             Patient Acceptance, E,D, VU,DU by AB at 5/10/2024 1100    Acceptance, E,D, VU,DU by  at 5/9/2024 1825                         Point: Home exercise program (Done)       Learning Progress Summary             Patient Acceptance, E,D, VU,DU  by AB at 5/10/2024 1100    Acceptance, E,D, VU,DU by  at 5/9/2024 1825                         Point: Body mechanics (Done)       Learning Progress Summary             Patient Acceptance, E,D, VU,DU by AB at 5/10/2024 1100    Acceptance, E,D, VU,DU by  at 5/9/2024 1825                         Point: Precautions (Done)       Learning Progress Summary             Patient Acceptance, E,D, VU,DU by AB at 5/10/2024 1100    Acceptance, E,D, VU,DU by  at 5/9/2024 1825                                         User Key       Initials Effective Dates Name Provider Type Discipline     12/15/23 -  Dora Fitzgerald PT Physical Therapist PT    AB 09/22/22 -  Mercedes Shaw PT Physical Therapist PT                  PT Recommendation and Plan     Plan of Care Reviewed With: patient  Progress: improving  Outcome Evaluation: Pt with excellent effort and increased ambulation distance to 190' with CGA and RW. No overt LOB or knee buckling. Pt continues to present below her functional baseline with decreased L knee ROM/strength, acute pain, and decreased endurance. Pt reports she will have assist as needed at home. Based on improved functional performance PT is updating d/c recs to home with assist and HHPT.     Time Calculation:         PT Charges       Row Name 05/10/24 1100             Time Calculation    Start Time 0911  -AB      PT Received On 05/10/24  -AB         Timed Charges    05742 - PT Therapeutic Exercise Minutes 15  -AB      40125 - Gait Training Minutes  15  -AB      48264 - PT Therapeutic Activity Minutes 8  -AB         Total Minutes    Timed Charges Total Minutes 38  -AB       Total Minutes 38  -AB                User Key  (r) = Recorded By, (t) = Taken By, (c) = Cosigned By      Initials Name Provider Type    AB Mercedes Shaw PT Physical Therapist                  Therapy Charges for Today       Code Description Service Date Service Provider Modifiers Qty    56684136695 HC PT THER PROC EA 15 MIN 5/10/2024  Mercedes Shaw, PT GP 1    84304431091 HC GAIT TRAINING EA 15 MIN 5/10/2024 Mercedes Shaw, PT GP 1    46060409881 HC PT THERAPEUTIC ACT EA 15 MIN 5/10/2024 Mercedes Shaw, PT GP 1            PT G-Codes  Outcome Measure Options: AM-PAC 6 Clicks Basic Mobility (PT)  AM-PAC 6 Clicks Score (PT): 19  PT Discharge Summary  Anticipated Discharge Disposition (PT): home with assist, home with home health    Mercedes Shaw, PT  5/10/2024

## 2024-05-10 NOTE — PROGRESS NOTES
"/61 (BP Location: Left arm, Patient Position: Lying)   Pulse 89   Temp 98.2 °F (36.8 °C) (Oral)   Resp 16   Ht 162.6 cm (64\")   Wt 96.2 kg (212 lb)   SpO2 93%   BMI 36.39 kg/m²     Lab Results (last 24 hours)       Procedure Component Value Units Date/Time    POC Glucose Once [[id number]]  (Abnormal) Collected: 05/10/24 0733    Specimen: Blood Updated: 05/10/24 0734     Glucose 150 mg/dL     Basic Metabolic Panel [[id number]]  (Abnormal) Collected: 05/10/24 0404    Specimen: Blood Updated: 05/10/24 0553     Glucose 124 mg/dL      BUN 11 mg/dL      Creatinine 0.86 mg/dL      Sodium 144 mmol/L      Potassium 3.5 mmol/L      Comment: Slight hemolysis detected by analyzer. Result may be falsely elevated.        Chloride 110 mmol/L      CO2 19.0 mmol/L      Calcium 6.7 mg/dL      BUN/Creatinine Ratio 12.8     Anion Gap 15.0 mmol/L      eGFR 69.7 mL/min/1.73     Narrative:      GFR Normal >60  Chronic Kidney Disease <60  Kidney Failure <15    The GFR formula is only valid for adults with stable renal function between ages 18 and 70.    CBC & Differential [[id number]]  (Abnormal) Collected: 05/10/24 0404    Specimen: Blood Updated: 05/10/24 0509    Narrative:      The following orders were created for panel order CBC & Differential.  Procedure                               Abnormality         Status                     ---------                               -----------         ------                     CBC Auto Differential[[id number]]        Abnormal            Final result                 Please view results for these tests on the individual orders.    CBC Auto Differential [[id number]]  (Abnormal) Collected: 05/10/24 0404    Specimen: Blood Updated: 05/10/24 0509     WBC 12.14 10*3/mm3      RBC 3.78 10*6/mm3      Hemoglobin 11.8 g/dL      Hematocrit 38.5 %      .9 fL      MCH 31.2 pg      MCHC 30.6 g/dL      RDW 13.3 %      RDW-SD 50.4 fl      MPV 8.8 fL      Platelets 158 10*3/mm3      Neutrophil % " 79.2 %      Lymphocyte % 9.6 %      Monocyte % 10.5 %      Eosinophil % 0.0 %      Basophil % 0.2 %      Immature Grans % 0.5 %      Neutrophils, Absolute 9.62 10*3/mm3      Lymphocytes, Absolute 1.17 10*3/mm3      Monocytes, Absolute 1.27 10*3/mm3      Eosinophils, Absolute 0.00 10*3/mm3      Basophils, Absolute 0.02 10*3/mm3      Immature Grans, Absolute 0.06 10*3/mm3      nRBC 0.0 /100 WBC     POC Glucose Once [[id number]]  (Abnormal) Collected: 05/09/24 2140    Specimen: Blood Updated: 05/09/24 2141     Glucose 162 mg/dL     POC Glucose Once [[id number]]  (Abnormal) Collected: 05/09/24 2026    Specimen: Blood Updated: 05/09/24 2028     Glucose 169 mg/dL     POC Glucose Once [[id number]]  (Abnormal) Collected: 05/09/24 1610    Specimen: Blood Updated: 05/09/24 1611     Glucose 156 mg/dL     POC Glucose Once [[id number]]  (Normal) Collected: 05/09/24 1225    Specimen: Blood Updated: 05/09/24 1226     Glucose 116 mg/dL     Potassium [[id number]]  (Normal) Collected: 05/09/24 0843    Specimen: Blood Updated: 05/09/24 0903     Potassium 3.9 mmol/L      Comment: Slight hemolysis detected by analyzer. Result may be falsely elevated.       POC Glucose Once [[id number]]  (Abnormal) Collected: 05/09/24 0811    Specimen: Blood Updated: 05/09/24 0814     Glucose 144 mg/dL             Imaging Results (Last 24 Hours)       Procedure Component Value Units Date/Time    XR Knee 1 or 2 View Left [[id number]] Collected: 05/09/24 1239     Updated: 05/09/24 1243    Narrative:      XR KNEE 1 OR 2 VW LEFT    Date of Exam: 5/9/2024 12:19 PM EDT    Indication: Post-Op Knee Arthoplasty    Comparison: None available.    Findings:  Unremarkable appearance of a newly placed total knee arthroplasty which appears in satisfactory alignment, without evidence of hardware fracture or dandre-hardware lucency or periprosthetic fracture. There are expected postsurgical soft tissue changes   including soft tissue swelling with subcutaneous and intra-articular  air.      Impression:      Impression:  Expected postoperative appearance of newly placed left total knee arthroplasty.      Electronically Signed: Subhash Nobles MD    5/9/2024 12:40 PM EDT    Workstation ID: [id number]            Patient Care Team:  Luigi Mckeon MD as PCP - General (General Practice)    SUBJECTIVE: She reports she is doing well.  Pain is well-controlled.  She walked 80 feet with therapy yesterday and with nursing overnight.  Goal is to discharge home tomorrow.    PHYSICAL EXAM  Left knee incision is clean, dry and intact with mesh dressing in place  Thigh and calf soft and nontender  Neurovascular intact distally       Status post left knee replacement    Primary localized osteoarthritis of left knee    Hypertension    A-fib    Diabetes      PLAN / DISPOSITION: 78-year-old female postop day #1 status post left total knee arthroplasty  -Continue to mobilize with therapy as tolerated  -Eliquis for DVT prophylaxis.  Okay to resume normal dose on Monday as long as incision remains dry.  -Okay to shower with mesh dressing in place once nerve catheter removed  -Plan discharge home tomorrow pending clearance by therapy.  Home physical therapy with [facility]  -Follow-up in 2 to 3 weeks    Bar Olivera MD  05/10/24  07:55 EDT

## 2024-05-10 NOTE — PLAN OF CARE
"Goal Outcome Evaluation:  Plan of Care Reviewed With: patient        Progress: improving  Outcome Evaluation: OT evaluation completed. Pt presents with decreased I in ADLs, related t/fs, mobility compared to PLOF limited by decreased activity tolerance, impaired balance, mild muscle weakness at BUEs and decreased distal reach impacting LB ADLs. Pt issued LH AE to assist with LBD with improved performance noted in d/d socks, pt would benefit from further training to maximize I. Pt req'd variable A with other observed ADLs and gross CGA with fxl t/fs using FWW. Pt emotionally labile during session indicating feeling \"overwhelmed\" by needs s/p sx. Educated pt and informed RN. Pt is below baseline occupational performance and would benefit from IPOT POC and home w/ A and HHOT/PT at d/c when medically ready.      Anticipated Discharge Disposition (OT): home with assist, home with home health                        "

## 2024-05-10 NOTE — THERAPY EVALUATION
Patient Name: Lesvia Madera  : 1945    MRN: [id number]                              Today's Date: 5/10/2024       Admit Date: 2024    Visit Dx:     ICD-10-CM ICD-9-CM   1. Primary localized osteoarthritis of left knee  M17.12 715.16   2. Status post left knee replacement  Z96.652 V43.65     Patient Active Problem List   Diagnosis    Primary localized osteoarthritis of left knee    Status post left knee replacement    Hypertension    A-fib    Diabetes     Past Medical History:   Diagnosis Date    Arthritis     BILAT HANDS    Atrial fibrillation     Diabetes mellitus     Hypertension     Tailbone injury     PT STATES ITS CROOKED A BIT    Wears glasses     READERS     Past Surgical History:   Procedure Laterality Date    ANKLE SURGERY Right     PINS AND PLATES PLACED THEN REMOVED - NO HARDWARE IN PLACE NOW    BREAST BIOPSY Right     X1- NO MARKER    CATARACT EXTRACTION, BILATERAL Bilateral     TOTAL KNEE ARTHROPLASTY Left 2024    Procedure: TOTAL KNEE ARTHROPLASTY WITH CORI ROBOT - LEFT;  Surgeon: Bar Olivera MD;  Location: [facility];  Service: Robotics - Ortho;  Laterality: Left;    WISDOM TOOTH EXTRACTION        General Information       Row Name 05/10/24 1157          OT Time and Intention    Document Type evaluation  -JY     Mode of Treatment occupational therapy;individual therapy  -JY       Row Name 05/10/24 1157          General Information    Patient Profile Reviewed yes  -JY     Prior Level of Function min assist:;all household mobility;community mobility;gait;transfer;bed mobility;home management;cooking;cleaning;independent:;feeding;grooming;driving  -JY     Existing Precautions/Restrictions other (see comments);fall  adductor canal nerve cath  -JY     Barriers to Rehab --  emotionally labile  -JY       Row Name 05/10/24 1157          Occupational Profile    Environmental Supports and Barriers (Occupational Profile) step over shower w/ threshold w/ corner seat (not  "sufficient for sitting to bathe per pt), elevated toilet , DME: has cane (used at baseline), now has FWW - educated on options for extended height and UE support at toilet and shower seat options with visual reference provided  -JUSTICE       Row Name 05/10/24 1157          Living Environment    People in Home other (see comments)  pt lives alone, has hired A at night 7PM- 7AM and friends to assist during the day as available  -JY       Row Name 05/10/24 1157          Home Main Entrance    Number of Stairs, Main Entrance none  -JY     Stair Railings, Main Entrance none  -JY       Row Name 05/10/24 1157          Stairs Within Home, Primary    Stairs, Within Home, Primary all needs met on main level including bedroom, bathroom, kitchen  -JUSTICE     Number of Stairs, Within Home, Primary twelve;other (see comments)  see comment above  -JY       Row Name 05/10/24 1157          Cognition    Orientation Status (Cognition) oriented x 4  -JY       Row Name 05/10/24 1157          Safety Issues, Functional Mobility    Safety Issues Affecting Function (Mobility) insight into deficits/self-awareness;safety precaution awareness;safety precautions follow-through/compliance;problem-solving  -JUSTICE     Impairments Affecting Function (Mobility) balance;endurance/activity tolerance;pain;range of motion (ROM);strength  -JUSTICE     Comment, Safety Issues/Impairments (Mobility) pt alert and able to follow commands; became emotional during progressive interventions, training worried about limited A available at home, stated, \"overwhelming\"  -JUSTICE               User Key  (r) = Recorded By, (t) = Taken By, (c) = Cosigned By      Initials Name Provider Type    Micki Rojas OT Occupational Therapist                     Mobility/ADL's       Row Name 05/10/24 1205          Bed Mobility    Bed Mobility other (see comments)  received UI  -JUSTICE     Scooting/Bridging [place] (Bed Mobility) not tested  -JUSTICE     Supine-Sit [place] (Bed Mobility) not " "tested  -JY     Comment, (Bed Mobility) received UIC and preferred to remain OOB following OT in order to eat lunch meal in recliner thus did not assess bed mobility this session; did issue LH leg  to assist with movement of LLE during advancing of LEs to EOB portion of bed mobility, pt verbalized understanding of use and perceived benefit  -JY       Row Name 05/10/24 1205          Transfers    Transfers sit-stand transfer;stand-sit transfer  -JY     Comment, (Transfers) skilled cues for optimal hand placement for controlled ascend, descend specifically to push up from seated surface and to reach back prior to sitting once aligned and in close proximity to seated surface with LLE stepped forward prior to sitting  -JY       Row Name 05/10/24 1205          Sit-Stand Transfer    Sit-Stand [place] (Transfers) contact guard;1 person assist;verbal cues  -JJE     Assistive Device (Sit-Stand Transfers) walker, front-wheeled  -[employer] [place]       Row Name 05/10/24 1205          Stand-Sit Transfer    Stand-Sit [place] (Transfers) contact guard;1 person assist;verbal cues  -JJE     Assistive Device (Stand-Sit Transfers) walker, front-wheeled  -[employer] [place]       Row Name 05/10/24 1205          Functional Mobility    Functional Mobility- Ind. Level contact guard assist;1 person;verbal cues required  -JY     Functional Mobility- Device walker, front-wheeled  -[employer] [place]     Functional Mobility-Distance (Feet) --  brief stepping in front of recliner for ADL purposes  -JY     Functional Mobility- Comment defer to PT for specifics however during in room ADL related mobility pt req'd gross CGA with use of FWW throughout; pt emotional, stating \"overwhelmed\" by things following sx and during recovery and did not complete further mobility (Abbreviated) and returned to recliner  -JY     Patient was able to Ambulate yes  -JY       Row Name 05/10/24 1205          Activities of Daily Living    BADL Assessment/Intervention bathing;upper body " dressing;lower body dressing;grooming  -JY       Row Name 05/10/24 1205          Mobility    Extremity Weight-bearing Status left lower extremity  -JY     Left Lower Extremity (Weight-bearing Status) weight-bearing as tolerated (WBAT)  -       Row Name 05/10/24 1205          Bathing Assessment/Intervention    [place] Level (Bathing) bathing skills;not tested  -JY     Assistive Devices (Bathing) long-handled sponge  -JY     Comment, (Bathing) did not assess authentic bathing this date yet issued pt  sponge and pt able to simulate distal reach toward LEs with use; emphasized to pt no bathing while nerve cath still in place and to follow surgeon's orders after nerve cath removed, pt verbalized understanding  -Y       Row Name 05/10/24 1205          Upper Body Dressing Assessment/Training    [place] Level (Upper Body Dressing) doff;don;pull-over garment;contact guard assist  -JY     Position (Upper Body Dressing) unsupported sitting  -JY     Comment, (Upper Body Dressing) pt already dressed in personal dress upon OT arrival, based on pt skill set, anticipate only need for CGA for posterior mgmt for improved fit  -JY       Row Name 05/10/24 1205          Lower Body Dressing Assessment/Training    [place] Level (Lower Body Dressing) doff;don;socks;contact guard assist;verbal cues  -JY     Assistive Devices (Lower Body Dressing) reacher;sock-aid;long-handled shoe horn;leg ;other (see comments)  issued  AE to assist with distal reach, improve safety and I in d/d LB garments  -JY     Position (Lower Body Dressing) unsupported sitting  -JY     Comment, (Lower Body Dressing) pt preference to wear dress that was already donned upon OT arrival, did educate pt on optimal position, tech, seq for LBD while adhering to precautions related to nerve cath at LLE; emphasized need for close monitoring to avoid dislodging; instructed pt in which LE to thread garments over first/last for ease; pt utilized   sock aid and reacher to doff socks with MI and don socks with CGA to min A when using LH sock aid given difficulty to advance over heel (dry heels). Pt would benefit from further training in use of AE and ensure safety with clothing mgmt w/ gilberto cath  -[facility] Name 05/10/24 1205          Grooming Assessment/Training    [place] Level (Grooming) wash face, hands;set up  -     Position (Grooming) supported sitting  -J               User Key  (r) = Recorded By, (t) = Taken By, (c) = Cosigned By      Initials Name Provider Type    Micki Rojas OT Occupational Therapist                   Obj/Interventions       Row Name 05/10/24 1259          Sensory Assessment (Somatosensory)    Sensory Assessment (Somatosensory) bilateral UE;sensation intact  -     Bilateral UE Sensory Assessment general sensation;light touch awareness;intact  -     Sensory Assessment denies any numbness or tingling and able to recognize LT stimuli as intact and symmetrical at [facility] 05/10/24 1259          Vision Assessment/Intervention    Visual Impairment/Limitations corrective lenses for reading  -     Vision Assessment Comment denies any numbness or tingling and able to recognize LT stimuli as intact and symmetrical at [facility] 05/10/24 1259          Range of Motion Comprehensive    General Range of Motion bilateral upper extremity ROM WFL  -JY       Row Name 05/10/24 1259          Strength Comprehensive (MMT)    General Manual Muscle Testing (MMT) Assessment upper extremity strength deficits identified  -     Comment, General Manual Muscle Testing (MMT) Assessment BU MMS grossly 4+/5 per MMT  -JY       Row Name 05/10/24 1259          Motor Skills    Motor Skills functional endurance;coordination  -JY     Coordination bilateral;upper extremity;finger to nose;other (see comments)  finger thumb opposition  -JY     Functional Endurance mild decresae in activity tolerance toward more dynamic  demands  -JY       Row Name 05/10/24 1259          Balance    Balance Assessment sitting static balance;sitting dynamic balance;standing static balance;standing dynamic balance  -JY     Static Sitting Balance standby assist  -JY     Dynamic Sitting Balance standby assist  -JY     Position, Sitting Balance unsupported;sitting in chair  -JY     Static Standing Balance contact guard;verbal cues  -JY     Dynamic Standing Balance contact guard;verbal cues  -JY     Position/Device Used, Standing Balance supported;walker, front-wheeled  -JY     Balance Interventions sitting;standing;static;dynamic;sit to stand;supported;occupation based/functional task  -JY     Comment, Balance no overt LOB during seated or standing, utilized FWW throughout  -JY               User Key  (r) = Recorded By, (t) = Taken By, (c) = Cosigned By      Initials Name Provider Type    Micki Rojas, UZIEL Occupational Therapist                   Goals/Plan       Row Name 05/10/24 1415          Transfer Goal 1 (OT)    Activity/Assistive Device (Transfer Goal 1, OT) sit-to-stand/stand-to-sit;bed-to-chair/chair-to-bed;toilet;commode;commode, bedside without drop arms;walker, rolling  -JY     [place] Level/Cues Needed (Transfer Goal 1, OT) standby assist;verbal cues required  -JY     Time Frame (Transfer Goal 1, OT) long term goal (LTG);by discharge  -JY     Progress/Outcome (Transfer Goal 1, OT) new goal  -JY       Row Name 05/10/24 1415          Dressing Goal 1 (OT)    Activity/Device (Dressing Goal 1, OT) lower body dressing;other (see comments)  d/d LB garments with AE PRN  -JY     [place]/Cues Needed (Dressing Goal 1, OT) contact guard required;verbal cues required  -JY     Time Frame (Dressing Goal 1, OT) long term goal (LTG);by discharge  -JY     Progress/Outcome (Dressing Goal 1, OT) new goal  -JY       Row Name 05/10/24 1415          Toileting Goal 1 (OT)    Activity/Device (Toileting Goal 1, OT) adjust/manage clothing;perform perineal  hygiene;commode;commode, bedside without drop arms;grab bar/safety frame;raised toilet seat  -JY     [place] Level/Cues Needed (Toileting Goal 1, OT) contact guard required;standby assist;verbal cues required  -JY     Time Frame (Toileting Goal 1, OT) long term goal (LTG);by discharge  -JY     Progress/Outcome (Toileting Goal 1, OT) new goal  -       Row Name 05/10/24 1415          Strength Goal 1 (OT)    Strength Goal 1 (OT) PT to complete seated HEP encompassing BUEs targeting strength, endurance w/ progressive sets/reps/resistance in order to improve integration in ADLs, related t/fs, mobility  -JY     Time Frame (Strength Goal 1, OT) long term goal (LTG);by discharge  -JY     Progress/Outcome (Strength Goal 1, OT) new goal  -       Row Name 05/10/24 1419          Therapy Assessment/Plan (OT)    Planned Therapy Interventions (OT) activity tolerance training;adaptive equipment training;BADL retraining;functional balance retraining;occupation/activity based interventions;patient/caregiver education/training;ROM/therapeutic exercise;strengthening exercise;transfer/mobility retraining  -J               User Key  (r) = Recorded By, (t) = Taken By, (c) = Cosigned By      Initials Name Provider Type    Micki Rojas, UZIEL Occupational Therapist                   Clinical Impression       Row Name 05/10/24 3428          Pain Assessment    Pretreatment Pain Rating 0/10 - no pain  -JY     Posttreatment Pain Rating 0/10 - no pain  -JY     Pain Location - Side/Orientation Left  -JY     Pain Location anterior  -JY     Pain Location - knee  -JY     Pre/Posttreatment Pain Comment pt denied any pain pre and post OT interventions, reported 8/10 during lowering of LEs in recliner preparing to stand  -JY     Pain Intervention(s) Repositioned;Ambulation/increased activity;Rest;Nursing Notified  -       Row Name 05/10/24 6729          Plan of Care Review    Plan of Care Reviewed With patient  -JUSTICE     Progress improving   "-JY     Outcome Evaluation OT evaluation completed. Pt presents with decreased I in ADLs, related t/fs, mobility compared to PLOF limited by decreased activity tolerance, impaired balance, mild muscle weakness at BUEs and decreased distal reach impacting LB ADLs. Pt issued LH AE to assist with LBD with improved performance noted in d/d socks, pt would benefit from further training to maximize I. Pt req'd variable A with other observed ADLs and gross CGA with fxl t/fs using FWW. Pt emotionally labile during session indicating feeling \"overwhelmed\" by needs s/p sx. Educated pt and informed RN. Pt is below baseline occupational performance and would benefit from IPOT POC and home w/ A and HHOT/PT at d/c when medically ready.  -       Row Name 05/10/24 1304          Therapy Assessment/Plan (OT)    Patient/Family Therapy Goal Statement (OT) to maximize I in ADLs, related t/fs, mobility, return to PLOF  -JY     Rehab Potential (OT) good, to achieve stated therapy goals  -     Criteria for Skilled Therapeutic Interventions Met (OT) yes;skilled treatment is necessary  -J     Therapy Frequency (OT) daily  -[employer]       Row Name 05/10/24 1304          Therapy Plan Review/Discharge Plan (OT)    Equipment Needs Upon Discharge (OT) dressing equipment;bathing equipment  -     Anticipated Discharge Disposition (OT) home with assist;home with home health  -       Row Name 05/10/24 1304          Vital Signs    Pre Systolic BP Rehab 110  -JY     Pre Treatment Diastolic BP 45  -JY     Post Systolic BP Rehab 115  -JY     Post Treatment Diastolic BP 46  -JY     Pre SpO2 (%) 91  -JY     O2 Delivery Pre Treatment room air  -JY     O2 Delivery Intra Treatment room air  -JY     Post SpO2 (%) 95  -JY     O2 Delivery Post Treatment room air  -JY     Pre Patient Position Sitting  -JY     Intra Patient Position Standing  -JY     Post Patient Position Sitting  -Y       Row Name 05/10/24 1304          Positioning and Restraints    " Pre-Treatment Position sitting in chair/recliner  -JY     Post Treatment Position chair  -JY     In Chair notified nsg;reclined;call light within reach;encouraged to call for assist;exit alarm on;compression device;waffle cushion;legs elevated  -JY               User Key  (r) = Recorded By, (t) = Taken By, (c) = Cosigned By      Initials Name Provider Type    Micki Rojas, UZIEL Occupational Therapist                   Outcome Measures       Row Name 05/10/24 1417          How much help from another is currently needed...    Putting on and taking off regular lower body clothing? 3  -JY     Bathing (including washing, rinsing, and drying) 3  -JY     Toileting (which includes using toilet bed pan or urinal) 3  -JY     Putting on and taking off regular upper body clothing 4  -JY     Taking care of personal grooming (such as brushing teeth) 4  -JY     Eating meals 4  -JY     AM-PAC 6 Clicks Score (OT) 21  -JY       Row Name 05/10/24 1409 05/10/24 1100       How much help from another person do you currently need...    Turning from your back to your side while in flat bed without using bedrails? 4  -AB 4  -AB    Moving from lying on back to sitting on the side of a flat bed without bedrails? 3  -AB 3  -AB    Moving to and from a bed to a chair (including a wheelchair)? 3  -AB 3  -AB    Standing up from a chair using your arms (e.g., wheelchair, bedside chair)? 3  -AB 3  -AB    Climbing 3-5 steps with a railing? 3  -AB 3  -AB    To walk in hospital room? 3  -AB 3  -AB    AM-PAC 6 Clicks Score (PT) 19  -AB 19  -AB    Highest Level of Mobility Goal 6 --> Walk 10 steps or more  -AB 6 --> Walk 10 steps or more  -AB      Row Name 05/10/24 0845          How much help from another person do you currently need...    Turning from your back to your side while in flat bed without using bedrails? 3  -BM     Moving from lying on back to sitting on the side of a flat bed without bedrails? 3  -BM     Moving to and from a bed to a  chair (including a wheelchair)? 3  -BM     Standing up from a chair using your arms (e.g., wheelchair, bedside chair)? 3  -BM     Climbing 3-5 steps with a railing? 3  -BM     To walk in hospital room? 3  -BM     AM-PAC 6 Clicks Score (PT) 18  -BM     Highest Level of Mobility Goal 6 --> Walk 10 steps or more  -BM       Row Name 05/10/24 1417 05/10/24 1409       Functional Assessment    Outcome Measure Options AM-PAC 6 Clicks Daily Activity (OT)  -JY AM-PAC 6 Clicks Basic Mobility (PT)  -AB      Row Name 05/10/24 1100          Functional Assessment    Outcome Measure Options AM-PAC 6 Clicks Basic Mobility (PT)  -AB               User Key  (r) = Recorded By, (t) = Taken By, (c) = Cosigned By      Initials Name Provider Type    Micki Rojas, OT Occupational Therapist    Mercedes Kiran, PT Physical Therapist    Gayla Anaya, RN Registered Nurse                    Occupational Therapy Education       Title: PT OT SLP Therapies (In Progress)       Topic: Occupational Therapy (In Progress)       Point: ADL training (In Progress)       Description:   Instruct learner(s) on proper safety adaptation and remediation techniques during self care or transfers.   Instruct in proper use of assistive devices.                  Learning Progress Summary             Patient Acceptance, E,D, NR by JUSTICE at 5/10/2024 1103                         Point: Home exercise program (Not Started)       Description:   Instruct learner(s) on appropriate technique for monitoring, assisting and/or progressing therapeutic exercises/activities.                  Learner Progress:  Not documented in this visit.              Point: Precautions (In Progress)       Description:   Instruct learner(s) on prescribed precautions during self-care and functional transfers.                  Learning Progress Summary             Patient Acceptance, E,D, NR by JUSTICE at 5/10/2024 1103                         Point: Body mechanics (In Progress)        "Description:   Instruct learner(s) on proper positioning and spine alignment during self-care, functional mobility activities and/or exercises.                  Learning Progress Summary             Patient Acceptance, E,D, NR by JUSTICE at 5/10/2024 1103                                         User Key       Initials Effective Dates Name Provider Type Discipline    JUSTICE 06/16/21 -  Micki Foster OT Occupational Therapist OT                  OT Recommendation and Plan  Planned Therapy Interventions (OT): activity tolerance training, adaptive equipment training, BADL retraining, functional balance retraining, occupation/activity based interventions, patient/caregiver education/training, ROM/therapeutic exercise, strengthening exercise, transfer/mobility retraining  Therapy Frequency (OT): daily  Plan of Care Review  Plan of Care Reviewed With: patient  Progress: improving  Outcome Evaluation: OT evaluation completed. Pt presents with decreased I in ADLs, related t/fs, mobility compared to PLOF limited by decreased activity tolerance, impaired balance, mild muscle weakness at BUEs and decreased distal reach impacting LB ADLs. Pt issued LH AE to assist with LBD with improved performance noted in d/d socks, pt would benefit from further training to maximize I. Pt req'd variable A with other observed ADLs and gross CGA with fxl t/fs using FWW. Pt emotionally labile during session indicating feeling \"overwhelmed\" by needs s/p sx. Educated pt and informed RN. Pt is below baseline occupational performance and would benefit from IPOT POC and home w/ A and HHOT/PT at d/c when medically ready.     Time Calculation:   Evaluation Complexity (OT)  Review Occupational Profile/Medical/Therapy History Complexity: brief/low complexity  Assessment, Occupational Performance/Identification of Deficit Complexity: 1-3 performance deficits  Clinical Decision Making Complexity (OT): problem focused assessment/low complexity  Overall Complexity " of Evaluation (OT): low complexity     Time Calculation- OT       Row Name 05/10/24 1418 05/10/24 1410 05/10/24 1100       Time Calculation- OT    OT Start Time 1103  -JY -- --    OT Received On 05/10/24  -JY -- --    OT Goal Re-Cert Due Date 05/20/24 -JY -- --       Timed Charges    93878 - Gait Training Minutes  -- 15  -AB 15  -AB    99420 - OT Therapeutic Activity Minutes 10  -JY -- --    60193 - OT Self Care/Mgmt Minutes 19  -JY -- --       Untimed Charges    OT Eval/Re-eval Minutes 49  -JY -- --       Total Minutes    Timed Charges Total Minutes 29  -JY 15  -AB 15  -AB    Untimed Charges Total Minutes 49  -JY -- --     Total Minutes 78  -JY 15  -AB 15  -AB              User Key  (r) = Recorded By, (t) = Taken By, (c) = Cosigned By      Initials Name Provider Type    Micki Rojas OT Occupational Therapist    AB Mercedes Shaw, PT Physical Therapist                  Therapy Charges for Today       Code Description Service Date Service Provider Modifiers Qty    15907735403 HC OT THERAPEUTIC ACT EA 15 MIN 5/10/2024 Micki Foster OT GO 1    33150618838 HC OT SELF CARE/MGMT/TRAIN EA 15 MIN 5/10/2024 Micki Foster OT GO 1    94656744477 HC OT EVAL LOW COMPLEXITY 4 5/10/2024 Micki Foster OT GO 1                 Micki Foster OT  5/10/2024

## 2024-05-10 NOTE — PROGRESS NOTES
[facility]    Acute pain service Inpatient Progress Note    Patient Name: Lesvia Madera  :  1945  MRN:  [id number]        Acute Pain  Service Inpatient Progress Note:    Analgesia:Excellent  Pain Score:0/10  LOC: alert and awake  Resp Status: room air  Cardiac: VS stable  Side Effects:None  Catheter Site:clean, dressing intact and dry  Cath type: peripheral nerve cath with ON Q  Volume: 1mL,8ml, 8ml InfuSystem Pump.  Dosing/Volume: ropivacaine 0.2%  Catheter Plan:Catheter to remain Insitu and Continue catheter infusion rate unchanged  Comments:

## 2024-05-10 NOTE — CASE MANAGEMENT/SOCIAL WORK
Discharge Planning Assessment  [facility]     Patient Name: Lesvia Madera  MRN: [id number]  Today's Date: 5/10/2024    Admit Date: 5/9/2024    Plan: Home with [facility]   Discharge Needs Assessment       Row Name 05/10/24 0813       Living Environment    People in Home alone    Current Living Arrangements home    Potentially Unsafe Housing Conditions none    Primary Care Provided by self    Provides Primary Care For no one    Family Caregiver if Needed other relative(s)    Family Caregiver Names Kayli Gonzalesford (relative) [phone]    Able to Return to Prior Arrangements yes       Resource/Environmental Concerns    Resource/Environmental Concerns none    Transportation Concerns none       Transition Planning    Patient/Family Anticipates Transition to home with family    Patient/Family Anticipated Services at Transition none    Transportation Anticipated family or friend will provide       Discharge Needs Assessment    Readmission Within the Last 30 Days no previous admission in last 30 days    Equipment Currently Used at Home glucometer;shower chair    Concerns to be Addressed denies needs/concerns at this time    Anticipated Changes Related to Illness none    Equipment Needed After Discharge walker, rolling    Outpatient/Agency/Support Group Needs outpatient therapy    Discharge Facility/Level of Care Needs outpatient therapy    Provided Post Acute Provider List? Yes    Post Acute Provider List Outpatient Therapy    Provided Post Acute Provider Quality & Resource List? Yes    Delivered To Patient    Method of Delivery In person    Patient's Choice of Community Agency(s) [facility]                   Discharge Plan       Row Name 05/10/24 0815       Plan    Plan Home with [facility]    Patient/Family in Agreement with Plan yes    Plan Comments Spoke to patient at bedside. Lives alone in [place]. Contact is Kayli Alonzo (relative) [phone]. IS independent with ADL's. No problems affording  [facility] Medicare or medications. Has a glucometer and shower chair. No advanced directives. Plan is home with [facility]. Rolling walker ordered from [facility] at [facility]. CM will continue to follow.    Final Discharge Disposition Code 01 - home or self-care                  Continued Care and Services - Admitted Since 5/9/2024       Durable Medical Equipment Coordination complete.      Service Provider Request Status Selected Services Address Phone Fax Patient Preferred    [facility] [address] [facility] [place] [address] [phone] --              Therapy Coordination complete.      Service Provider Request Status Selected Services Address Phone Fax Patient Preferred    [facility]  [facility] Outpatient Physical [facility] [address] [place] [address] [phone] --                     Demographic Summary       Row Name 05/10/24 0812       General Information    Admission Type same day    Arrived From PACU/recovery room    Referral Source admission list    Reason for Consult discharge planning    Preferred Language English       Contact Information    Permission Granted to Share Info With     Contact Information Obtained for                    Functional Status       Row Name 05/10/24 0813       Functional Status    Usual Activity Tolerance good    Current Activity Tolerance good       Functional Status, IADL    Medications independent    Meal Preparation independent    Housekeeping independent    Laundry independent    Shopping independent       Mental Status    General Appearance WDL WDL       Mental Status Summary    Recent Changes in Mental Status/Cognitive Functioning no changes       Employment/    Employment Status retired                   Psychosocial    No documentation.                  Abuse/Neglect    No documentation.                  Legal    No documentation.                  Substance Abuse    No  documentation.                  Patient Forms    No documentation.                     Pb Armendariz RN

## 2024-05-10 NOTE — THERAPY TREATMENT NOTE
Patient Name: Lesvia Madera  : 1945    MRN: [id number]                              Today's Date: 5/10/2024       Admit Date: 2024    Visit Dx:     ICD-10-CM ICD-9-CM   1. Primary localized osteoarthritis of left knee  M17.12 715.16   2. Status post left knee replacement  Z96.652 V43.65     Patient Active Problem List   Diagnosis    Primary localized osteoarthritis of left knee    Status post left knee replacement    Hypertension    A-fib    Diabetes     Past Medical History:   Diagnosis Date    Arthritis     BILAT HANDS    Atrial fibrillation     Diabetes mellitus     Hypertension     Tailbone injury     PT STATES ITS CROOKED A BIT    Wears glasses     READERS     Past Surgical History:   Procedure Laterality Date    ANKLE SURGERY Right     PINS AND PLATES PLACED THEN REMOVED - NO HARDWARE IN PLACE NOW    BREAST BIOPSY Right     X1- NO MARKER    CATARACT EXTRACTION, BILATERAL Bilateral     TOTAL KNEE ARTHROPLASTY Left 2024    Procedure: TOTAL KNEE ARTHROPLASTY WITH CORI ROBOT - LEFT;  Surgeon: Bar Olivera MD;  Location: [facility];  Service: Robotics - Ortho;  Laterality: Left;    WISDOM TOOTH EXTRACTION        General Information       Row Name 05/10/24 1358 05/10/24 1024       Physical Therapy Time and Intention    Document Type therapy note (daily note)  -AB therapy note (daily note)  -AB    Mode of Treatment physical therapy  -AB physical therapy  -AB      Row Name 05/10/24 1358 05/10/24 1024       General Information    Patient Profile Reviewed yes  -AB yes  -AB    Existing Precautions/Restrictions other (see comments);fall  adductor canal nerve cath  -AB other (see comments);fall  adductor canal nerve cath  -AB    Barriers to Rehab none identified  -AB none identified  -AB      Row Name 05/10/24 1024          Living Environment    People in Home --  Pt reports she has hired someone to stay at night at d/c and friends to provide assist during the day.  -AB       Row Name  05/10/24 1358 05/10/24 1024       Cognition    Orientation Status (Cognition) oriented x 4  -AB oriented x 3  -AB      Row Name 05/10/24 1358 05/10/24 1024       Safety Issues, Functional Mobility    Safety Issues Affecting Function (Mobility) awareness of need for assistance;insight into deficits/self-awareness;safety precaution awareness;safety precautions follow-through/compliance  -AB insight into deficits/self-awareness;safety precaution awareness;safety precautions follow-through/compliance  -AB    Impairments Affecting Function (Mobility) balance;endurance/activity tolerance;pain;range of motion (ROM);strength  -AB balance;endurance/activity tolerance;pain;range of motion (ROM);strength  -AB              User Key  (r) = Recorded By, (t) = Taken By, (c) = Cosigned By      Initials Name Provider Type    AB Mercedes Shaw PT Physical Therapist                   Mobility       Row Name 05/10/24 1359 05/10/24 1039       Bed Mobility    Bed Mobility -- supine-sit;scooting/bridging  -AB    Scooting/Bridging Baker (Bed Mobility) -- contact guard  -AB    Supine-Sit Baker (Bed Mobility) -- contact guard  -AB    Assistive Device (Bed Mobility) -- head of bed elevated  -AB    Comment, (Bed Mobility) UI  -AB assist for line management  -AB      Row Name 05/10/24 1359 05/10/24 1039       Transfers    Comment, (Transfers) Cues for hand placement and sequencing.  -AB Cues for hand placement and sequencing. Denied dizziness throughout.  -AB      Row Name 05/10/24 1359 05/10/24 1039       Sit-Stand Transfer    Sit-Stand Baker (Transfers) contact guard;1 person assist;verbal cues  -AB verbal cues;1 person assist;contact guard  -AB    Assistive Device (Sit-Stand Transfers) walker, front-wheeled  -AB walker, front-wheeled  -AB    Comment, (Sit-Stand Transfer) Cues to advance LLE prior to stand for comfort.  -AB Cues to advance LLE prior to stand for comfort.  -AB      Row Name 05/10/24 1359 05/10/24 1039        Gait/Stairs (Locomotion)    [place] Level (Gait) contact guard;verbal cues;1 person assist  -AB contact guard;verbal cues;1 person assist  -AB    Assistive Device (Gait) walker, front-wheeled  -AB walker, front-wheeled  -AB    Patient was able to Ambulate yes  -AB yes  -AB    Distance in Feet (Gait) 200  -  -AB    Deviations/Abnormal Patterns (Gait) bilateral deviations;base of support, wide;weight shifting decreased;stride length decreased;gait speed decreased;antalgic  -AB bilateral deviations;base of support, wide;weight shifting decreased;stride length decreased;gait speed decreased  -AB    Bilateral Gait Deviations forward flexed posture;heel strike decreased  -AB forward flexed posture;heel strike decreased  -AB    Left Sided Gait Deviations weight shift ability decreased  -AB --    Comment, (Gait/Stairs) Pt ambulated with step through gait pattern, decreased L knee flexion during swing phase, and slowed valentín. Cues for upright posture, keeping walker closer to body, and improved heel strike. No overt LOB or knee buckling. Further activity limited by pain and fatigue.  -AB Pt ambulated with step through gait pattern, slowed valentín, and decreased weight acceptance onto LLE. Cues for upright posture, improved heel strike, and keeping walker closer to body. No overt LOB or knee buckling. Gait mechanics improved with cues.  -AB      Row Name 05/10/24 1359 05/10/24 1039       Mobility    Extremity Weight-bearing Status left lower extremity  -AB left lower extremity  -AB    Left Lower Extremity (Weight-bearing Status) weight-bearing as tolerated (WBAT)  -AB weight-bearing as tolerated (WBAT)  -AB              User Key  (r) = Recorded By, (t) = Taken By, (c) = Cosigned By      Initials Name Provider Type    AB Mercedes Shaw, PT Physical Therapist                   Obj/Interventions       Row Name 05/10/24 1049          Range of Motion Comprehensive    General Range of Motion lower extremity  range of motion deficits identified  -AB     Comment, General Range of Motion L knee AROM 10-75  -AB       Row Name 05/10/24 1405 05/10/24 1049       Motor Skills    Therapeutic Exercise ankle;knee  -AB knee;ankle  -AB      Row Name 05/10/24 1405 05/10/24 1049       Knee (Therapeutic Exercise)    Knee Isometrics (Therapeutic Exercise) left;quad sets;10 repetitions  -AB left;quad sets;10 repetitions  -AB    Knee Strengthening (Therapeutic Exercise) left;SLR (straight leg raise);SAQ (short arc quad);heel slides;10 repetitions  -AB left;SLR (straight leg raise);SAQ (short arc quad);LAQ (long arc quad);heel slides;10 repetitions  -AB      Row Name 05/10/24 1405 05/10/24 1049       Ankle (Therapeutic Exercise)    Ankle (Therapeutic Exercise) AROM (active range of motion)  -AB AROM (active range of motion)  -AB    Ankle AROM (Therapeutic Exercise) bilateral;dorsiflexion;plantarflexion;10 repetitions  -AB bilateral;dorsiflexion;plantarflexion;10 repetitions  -AB      Row Name 05/10/24 1405 05/10/24 1049       Balance    Balance Assessment sitting static balance;sitting dynamic balance;standing static balance;standing dynamic balance  -AB sitting static balance;sitting dynamic balance;standing static balance;standing dynamic balance  -AB    Static Sitting Balance standby assist  -AB standby assist  -AB    Dynamic Sitting Balance standby assist  -AB standby assist  -AB    Position, Sitting Balance unsupported;sitting in chair  -AB unsupported;sitting edge of bed  -AB    Static Standing Balance contact guard  -AB contact guard  -AB    Dynamic Standing Balance contact guard;1-person assist;verbal cues  -AB contact guard;1-person assist;verbal cues  -AB    Position/Device Used, Standing Balance supported;walker, front-wheeled  -AB supported;walker, front-wheeled  -AB    Balance Interventions sitting;standing;sit to stand;supported;static;dynamic;occupation based/functional task  -AB sitting;standing;sit to  stand;supported;static;dynamic;occupation based/functional task  -AB    Comment, Balance No overt LOB or knee buckling.  -AB No overt LOB or knee buckling  -AB              User Key  (r) = Recorded By, (t) = Taken By, (c) = Cosigned By      Initials Name Provider Type    AB Mercedes Shaw, PT Physical Therapist                   Goals/Plan    No documentation.                  Clinical Impression       Row Name 05/10/24 1407 05/10/24 1054       Pain    Pretreatment Pain Rating 5/10  -AB 5/10  -AB    Posttreatment Pain Rating 5/10  -AB 5/10  -AB    Pain Location - Side/Orientation Left  -AB Left  -AB    Pain Location generalized  -AB anterior  -AB    Pain Location - knee  -AB knee  -AB    Pre/Posttreatment Pain Comment tolerated  -AB tolerated.  -AB    Pain Intervention(s) Ambulation/increased activity;Repositioned;Cold applied;Elevated  -AB Ambulation/increased activity;Elevated;Repositioned;Cold applied;Nursing Notified  -AB    Additional Documentation Pain Scale: Numbers Pre/Post-Treatment (Group)  -AB Pain Scale: Numbers Pre/Post-Treatment (Group)  -AB      Row Name 05/10/24 1407 05/10/24 1055       Plan of Care Review    Plan of Care Reviewed With patient;friend  -AB patient  -AB    Progress improving  -AB improving  -AB    Outcome Evaluation Pt with good effort and increased ambulation distance to 200' with CGA and RW. No overt LOB or knee buckling. HEP completed. Pt continues to present below her functional baseline with weakness, impaired endurance, and acute pain. Further IPPT is warrented. PT will progress as able per POC.  -AB Pt with excellent effort and increased ambulation distance to 190' with CGA and RW. No overt LOB or knee buckling. Pt continues to present below her functional baseline with decreased L knee ROM/strength, acute pain, and decreased endurance. Pt reports she will have assist as needed at home. Based on improved functional performance PT is updating d/c recs to home with assist and  HHPT.  -AB      Row Name 05/10/24 1407 05/10/24 1054       Vital Signs    Pre Systolic BP Rehab 115  -  -AB    Pre Treatment Diastolic BP 66  -AB 88  -AB    Post Systolic BP Rehab -- 151  -AB    Post Treatment Diastolic BP -- 46  -AB    Pre SpO2 (%) 95  -AB 91  -AB    O2 Delivery Pre Treatment room air  -AB room air  -AB    O2 Delivery Intra Treatment room air  -AB room air  -AB    Post SpO2 (%) 96  -AB 96  -AB    O2 Delivery Post Treatment room air  -AB room air  -AB    Pre Patient Position Sitting  -AB Supine  -AB    Intra Patient Position Standing  -AB Standing  -AB    Post Patient Position Sitting  -AB Sitting  -AB      Row Name 05/10/24 1407 05/10/24 1054       Positioning and Restraints    Pre-Treatment Position sitting in chair/recliner  -AB in bed  -AB    Post Treatment Position chair  -AB chair  -AB    In Chair notified nsg;sitting;reclined;call light within reach;encouraged to call for assist;exit alarm on;legs elevated;waffle cushion;compression device  -AB notified nsg;reclined;sitting;call light within reach;exit alarm on;encouraged to call for assist;legs elevated;waffle cushion;compression device;with nsg  -AB              User Key  (r) = Recorded By, (t) = Taken By, (c) = Cosigned By      Initials Name Provider Type    Mercedes Kiran, PT Physical Therapist                   Outcome Measures       Row Name 05/10/24 1409 05/10/24 1100       How much help from another person do you currently need...    Turning from your back to your side while in flat bed without using bedrails? 4  -AB 4  -AB    Moving from lying on back to sitting on the side of a flat bed without bedrails? 3  -AB 3  -AB    Moving to and from a bed to a chair (including a wheelchair)? 3  -AB 3  -AB    Standing up from a chair using your arms (e.g., wheelchair, bedside chair)? 3  -AB 3  -AB    Climbing 3-5 steps with a railing? 3  -AB 3  -AB    To walk in hospital room? 3  -AB 3  -AB    AM-PAC 6 Clicks Score (PT) 19  -AB 19   -AB    Highest Level of Mobility Goal 6 --> Walk 10 steps or more  -AB 6 --> Walk 10 steps or more  -AB      Row Name 05/10/24 0845          How much help from another person do you currently need...    Turning from your back to your side while in flat bed without using bedrails? 3  -BM     Moving from lying on back to sitting on the side of a flat bed without bedrails? 3  -BM     Moving to and from a bed to a chair (including a wheelchair)? 3  -BM     Standing up from a chair using your arms (e.g., wheelchair, bedside chair)? 3  -BM     Climbing 3-5 steps with a railing? 3  -BM     To walk in hospital room? 3  -BM     AM-PAC 6 Clicks Score (PT) 18  -BM     Highest Level of Mobility Goal 6 --> Walk 10 steps or more  -BM       Row Name 05/10/24 1409 05/10/24 1100       Functional Assessment    Outcome Measure Options AM-PAC 6 Clicks Basic Mobility (PT)  -AB AM-PAC 6 Clicks Basic Mobility (PT)  -AB              User Key  (r) = Recorded By, (t) = Taken By, (c) = Cosigned By      Initials Name Provider Type    AB Mercedes Shaw, PT Physical Therapist    BM Gayla Dickson, RN Registered Nurse                                 Physical Therapy Education       Title: PT OT SLP Therapies (Done)       Topic: Physical Therapy (Done)       Point: Mobility training (Done)       Learning Progress Summary             Patient Acceptance, E,D, VU,NR by AB at 5/10/2024 1409    Acceptance, E,D, VU,DU by AB at 5/10/2024 1100    Acceptance, E,D, VU,DU by  at 5/9/2024 1825                         Point: Home exercise program (Done)       Learning Progress Summary             Patient Acceptance, E,D, VU,NR by AB at 5/10/2024 1409    Acceptance, E,D, VU,DU by AB at 5/10/2024 1100    Acceptance, E,D, VU,DU by  at 5/9/2024 1825                         Point: Body mechanics (Done)       Learning Progress Summary             Patient Acceptance, E,D, VU,NR by AB at 5/10/2024 1409    Acceptance, E,D, VU,DU by AB at 5/10/2024 1100     Acceptance, E,D, VU,DU by  at 5/9/2024 1825                         Point: Precautions (Done)       Learning Progress Summary             Patient Acceptance, E,D, VU,NR by AB at 5/10/2024 1409    Acceptance, E,D, VU,DU by AB at 5/10/2024 1100    Acceptance, E,D, VU,DU by  at 5/9/2024 1825                                         User Key       Initials Effective Dates Name Provider Type Discipline     12/15/23 -  Dora Fitzgerald, PT Physical Therapist PT    AB 09/22/22 -  Mercedes Shaw PT Physical Therapist PT                  PT Recommendation and Plan     Plan of Care Reviewed With: patient, friend  Progress: improving  Outcome Evaluation: Pt with good effort and increased ambulation distance to 200' with CGA and RW. No overt LOB or knee buckling. HEP completed. Pt continues to present below her functional baseline with weakness, impaired endurance, and acute pain. Further IPPT is warrented. PT will progress as able per POC.     Time Calculation:         PT Charges       Row Name 05/10/24 1410 05/10/24 1100          Time Calculation    Start Time 1322  -AB 0911  -AB     PT Received On 05/10/24  -AB 05/10/24  -AB        Timed Charges    66793 - PT Therapeutic Exercise Minutes 10  -AB 15  -AB     44616 - Gait Training Minutes  15  -AB 15  -AB     49176 - PT Therapeutic Activity Minutes 5  -AB 8  -AB        Total Minutes    Timed Charges Total Minutes 30  -AB 38  -AB      Total Minutes 30  -AB 38  -AB               User Key  (r) = Recorded By, (t) = Taken By, (c) = Cosigned By      Initials Name Provider Type    AB Mercedes Shaw, PT Physical Therapist                  Therapy Charges for Today       Code Description Service Date Service Provider Modifiers Qty    62769513330 HC PT THER PROC EA 15 MIN 5/10/2024 Mercedes Shaw, PT GP 1    64887061803 HC GAIT TRAINING EA 15 MIN 5/10/2024 Mercedes Shaw, PT GP 1    52877191035 HC PT THERAPEUTIC ACT EA 15 MIN 5/10/2024 Mercedes Shaw, PT GP 1    98467562698 HC PT  THER PROC EA 15 MIN 5/10/2024 Mercedes Shaw, PT GP 1    03088094799 HC GAIT TRAINING EA 15 MIN 5/10/2024 Mercedes Shaw, PT GP 1            PT G-Codes  Outcome Measure Options: AM-PAC 6 Clicks Basic Mobility (PT)  AM-PAC 6 Clicks Score (PT): 19  PT Discharge Summary  Anticipated Discharge Disposition (PT): home with assist, home with home health    Mercedes Shaw, PT  5/10/2024

## 2024-05-10 NOTE — PLAN OF CARE
Goal Outcome Evaluation:  Plan of Care Reviewed With: patient, friend        Progress: improving  Outcome Evaluation: Pt with good effort and increased ambulation distance to 200' with CGA and RW. No overt LOB or knee buckling. HEP completed. Pt continues to present below her functional baseline with weakness, impaired endurance, and acute pain. Further IPPT is warrented. PT will progress as able per POC.      Anticipated Discharge Disposition (PT): home with assist, home with home health

## 2024-05-10 NOTE — PROGRESS NOTES
"IM progress note      Lesvia Madera  [id number]  1945     LOS: 0 days     Attending: Bar Olivera,*    Primary Care Provider: Luigi Mckeon MD      Chief Complaint/Reason for visit:  No chief complaint on file.      Subjective   Patient feels fine, denying fever or chills, blood pressure dropped this morning, no dizziness, no headache, no double vision or blurry vision    Objective        Visit Vitals  /85 (BP Location: Right arm, Patient Position: Sitting)   Pulse 72   Temp 98.1 °F (36.7 °C) (Oral)   Resp 18   Ht 162.6 cm (64\")   Wt 96.2 kg (212 lb)   SpO2 96%   BMI 36.39 kg/m²     Temp (24hrs), Av °F (36.7 °C), Min:97.7 °F (36.5 °C), Max:98.2 °F (36.8 °C)      Intake/Output:    Intake/Output Summary (Last 24 hours) at 5/10/2024 1610  Last data filed at 5/10/2024 1430  Gross per 24 hour   Intake 660 ml   Output 1600 ml   Net -940 ml        Physical Therapy:    Physical Exam:     General Appearance:    Alert, cooperative, in no acute distress   Head:    Normocephalic, without obvious abnormality, atraumatic    Lungs:     Normal effort, symmetric chest rise,  clear to      auscultation bilaterally              Heart:    Regular rhythm and normal rate, normal S1 and S2    Abdomen:     Normal bowel sounds, no masses, no organomegaly, soft        non-tender, non-distended, no guarding, no rebound                tenderness   Extremities: Left knee with mild tenderness, no edema,  No clubbing, cyanosis or edema.  No deformities.    Pulses:   Pulses palpable and equal bilaterally   Skin:   No bleeding, bruising or rash          Results Review:     I reviewed the patient's new clinical results.   Results from last 7 days   Lab Units 05/10/24  0404   WBC 10*3/mm3 12.14*   HEMOGLOBIN g/dL 11.8*   HEMATOCRIT % 38.5   PLATELETS 10*3/mm3 158     Results from last 7 days   Lab Units 05/10/24  0404 24  0843   SODIUM mmol/L 144  --    POTASSIUM mmol/L 3.5 3.9   CHLORIDE mmol/L 110*  --  "   CO2 mmol/L 19.0*  --    BUN mg/dL 11  --    CREATININE mg/dL 0.86  --    CALCIUM mg/dL 6.7*  --    GLUCOSE mg/dL 124*  --      I reviewed the patient's new imaging including images and reports.    All medications reviewed.   acetaminophen, 1,000 mg, Oral, Q6H  apixaban, 2.5 mg, Oral, Q12H  atorvastatin, 40 mg, Oral, Nightly  bisoprolol, 5 mg, Oral, Daily  digoxin, 125 mcg, Oral, Daily  DULoxetine, 60 mg, Oral, Daily  insulin lispro, 2-7 Units, Subcutaneous, 4x Daily AC & at Bedtime  [Held by provider] lisinopril, 10 mg, Oral, Daily      bisacodyl, 10 mg, Daily PRN  bisacodyl, 10 mg, Daily PRN  dextrose, 25 g, Q15 Min PRN  dextrose, 15 g, Q15 Min PRN  diphenhydrAMINE, 25 mg, Q6H PRN   Or  diphenhydrAMINE, 25 mg, Q6H PRN  docusate sodium, 100 mg, BID PRN  glucagon (human recombinant), 1 mg, Q15 Min PRN  HYDROmorphone, 0.5 mg, Q2H PRN   And  naloxone, 0.1 mg, Q5 Min PRN  hydrOXYzine, 25 mg, TID PRN  ketorolac, 15 mg, Q6H PRN  labetalol, 10 mg, Q4H PRN  magnesium hydroxide, 30 mL, Daily PRN  ondansetron ODT, 4 mg, Q6H PRN   Or  ondansetron, 4 mg, Q6H PRN  oxyCODONE, 10 mg, Q4H PRN  oxyCODONE, 5 mg, Q4H PRN  senna-docusate sodium, 2 tablet, BID PRN  sodium chloride, 500 mL, TID PRN        Assessment & Plan       Status post left knee replacement    Primary localized osteoarthritis of left knee    Hypertension    A-fib    Diabetes    1. PT/OT- WBAT LLE  2. Pain control-prns, AC nerve block  3. IS-encourage  4. DVT proph-Mechs/Eliquis  5. Bowel regimen  6. Resume home medications as appropriate  7. Monitor post-op labs  8. DC planning for home tomorrow     HTN, Hyperlipidemia  Blood pressure running on the low side  Hold lisinopril  Hold Zebeta if systolic blood pressure less than 110  Continue to hold spironolactone for now  - Monitor BP   - Labetalol PRN for SBP>170     DM  - hgb A1c on 4/26/24 6.6  - Hold OHA while inpatient  - Accu-Chek AC and HS with low dose SSI     Hypokalemia  Replace potassium  Monitor  electrolytes closely    Hypocalcemia  Calcium gluconate 1 g IV  Monitor calcium level and check ionized calcium in the morning    Mild leukocytosis  Likely reactive    A-fib  Monitor heart rate closely  Restart Zebeta when blood pressure tolerate  Continue Eliquis twice daily  Check labs in the morning and monitor white count closely       Ha Mccrary MD  05/10/24  16:10 EDT

## 2024-05-11 VITALS
HEART RATE: 91 BPM | SYSTOLIC BLOOD PRESSURE: 150 MMHG | DIASTOLIC BLOOD PRESSURE: 87 MMHG | WEIGHT: 212 LBS | HEIGHT: 64 IN | OXYGEN SATURATION: 93 % | TEMPERATURE: 98.2 F | BODY MASS INDEX: 36.19 KG/M2 | RESPIRATION RATE: 18 BRPM

## 2024-05-11 LAB
ANION GAP SERPL CALCULATED.3IONS-SCNC: 8 MMOL/L (ref 5–15)
BASOPHILS # BLD AUTO: 0.03 10*3/MM3 (ref 0–0.2)
BASOPHILS NFR BLD AUTO: 0.2 % (ref 0–1.5)
BUN SERPL-MCNC: 10 MG/DL (ref 8–23)
BUN/CREAT SERPL: 18.5 (ref 7–25)
CA-I SERPL ISE-MCNC: 1.16 MMOL/L (ref 1.12–1.32)
CALCIUM SPEC-SCNC: 8.1 MG/DL (ref 8.6–10.5)
CHLORIDE SERPL-SCNC: 108 MMOL/L (ref 98–107)
CO2 SERPL-SCNC: 25 MMOL/L (ref 22–29)
CREAT SERPL-MCNC: 0.54 MG/DL (ref 0.57–1)
DEPRECATED RDW RBC AUTO: 49.2 FL (ref 37–54)
EGFRCR SERPLBLD CKD-EPI 2021: 95 ML/MIN/1.73
EOSINOPHIL # BLD AUTO: 0.06 10*3/MM3 (ref 0–0.4)
EOSINOPHIL NFR BLD AUTO: 0.5 % (ref 0.3–6.2)
ERYTHROCYTE [DISTWIDTH] IN BLOOD BY AUTOMATED COUNT: 13.5 % (ref 12.3–15.4)
GLUCOSE BLDC GLUCOMTR-MCNC: 118 MG/DL (ref 70–130)
GLUCOSE BLDC GLUCOMTR-MCNC: 127 MG/DL (ref 70–130)
GLUCOSE SERPL-MCNC: 126 MG/DL (ref 65–99)
HCT VFR BLD AUTO: 42.9 % (ref 34–46.6)
HGB BLD-MCNC: 13.6 G/DL (ref 12–15.9)
IMM GRANULOCYTES # BLD AUTO: 0.07 10*3/MM3 (ref 0–0.05)
IMM GRANULOCYTES NFR BLD AUTO: 0.6 % (ref 0–0.5)
LYMPHOCYTES # BLD AUTO: 1.51 10*3/MM3 (ref 0.7–3.1)
LYMPHOCYTES NFR BLD AUTO: 12.5 % (ref 19.6–45.3)
MAGNESIUM SERPL-MCNC: 2 MG/DL (ref 1.6–2.4)
MCH RBC QN AUTO: 31.3 PG (ref 26.6–33)
MCHC RBC AUTO-ENTMCNC: 31.7 G/DL (ref 31.5–35.7)
MCV RBC AUTO: 98.8 FL (ref 79–97)
MONOCYTES # BLD AUTO: 1.47 10*3/MM3 (ref 0.1–0.9)
MONOCYTES NFR BLD AUTO: 12.2 % (ref 5–12)
NEUTROPHILS NFR BLD AUTO: 74 % (ref 42.7–76)
NEUTROPHILS NFR BLD AUTO: 8.94 10*3/MM3 (ref 1.7–7)
NRBC BLD AUTO-RTO: 0 /100 WBC (ref 0–0.2)
PLATELET # BLD AUTO: 186 10*3/MM3 (ref 140–450)
PMV BLD AUTO: 9.2 FL (ref 6–12)
POTASSIUM SERPL-SCNC: 4.1 MMOL/L (ref 3.5–5.2)
RBC # BLD AUTO: 4.34 10*6/MM3 (ref 3.77–5.28)
SODIUM SERPL-SCNC: 141 MMOL/L (ref 136–145)
WBC NRBC COR # BLD AUTO: 12.08 10*3/MM3 (ref 3.4–10.8)

## 2024-05-11 PROCEDURE — 82948 REAGENT STRIP/BLOOD GLUCOSE: CPT

## 2024-05-11 PROCEDURE — 63710000001 DULOXETINE 60 MG CAPSULE DELAYED-RELEASE PARTICLES: Performed by: NURSE PRACTITIONER

## 2024-05-11 PROCEDURE — 85025 COMPLETE CBC W/AUTO DIFF WBC: CPT | Performed by: ORTHOPAEDIC SURGERY

## 2024-05-11 PROCEDURE — 97110 THERAPEUTIC EXERCISES: CPT

## 2024-05-11 PROCEDURE — 63710000001 ACETAMINOPHEN EXTRA STRENGTH 500 MG TABLET: Performed by: ORTHOPAEDIC SURGERY

## 2024-05-11 PROCEDURE — 63710000001 OXYCODONE 5 MG TABLET: Performed by: ORTHOPAEDIC SURGERY

## 2024-05-11 PROCEDURE — 82330 ASSAY OF CALCIUM: CPT | Performed by: INTERNAL MEDICINE

## 2024-05-11 PROCEDURE — A9270 NON-COVERED ITEM OR SERVICE: HCPCS | Performed by: NURSE PRACTITIONER

## 2024-05-11 PROCEDURE — A9270 NON-COVERED ITEM OR SERVICE: HCPCS | Performed by: ORTHOPAEDIC SURGERY

## 2024-05-11 PROCEDURE — 63710000001 DIGOXIN 125 MCG TABLET: Performed by: NURSE PRACTITIONER

## 2024-05-11 PROCEDURE — 97116 GAIT TRAINING THERAPY: CPT

## 2024-05-11 PROCEDURE — 63710000001 BISOPROLOL 5 MG TABLET: Performed by: NURSE PRACTITIONER

## 2024-05-11 PROCEDURE — 63710000001 APIXABAN 2.5 MG TABLET: Performed by: ORTHOPAEDIC SURGERY

## 2024-05-11 PROCEDURE — 63710000001 DOCUSATE SODIUM 100 MG CAPSULE: Performed by: ORTHOPAEDIC SURGERY

## 2024-05-11 PROCEDURE — 80048 BASIC METABOLIC PNL TOTAL CA: CPT | Performed by: INTERNAL MEDICINE

## 2024-05-11 PROCEDURE — 83735 ASSAY OF MAGNESIUM: CPT | Performed by: INTERNAL MEDICINE

## 2024-05-11 RX ORDER — OXYCODONE HYDROCHLORIDE 5 MG/1
5 TABLET ORAL EVERY 4 HOURS PRN
Qty: 30 TABLET | Refills: 0 | Status: SHIPPED | OUTPATIENT
Start: 2024-05-11

## 2024-05-11 RX ORDER — DOCUSATE SODIUM 100 MG/1
100 CAPSULE, LIQUID FILLED ORAL 2 TIMES DAILY
Qty: 30 CAPSULE | Refills: 0 | Status: SHIPPED | OUTPATIENT
Start: 2024-05-11 | End: 2024-05-26

## 2024-05-11 RX ADMIN — ACETAMINOPHEN 1000 MG: 500 TABLET ORAL at 09:50

## 2024-05-11 RX ADMIN — ACETAMINOPHEN 1000 MG: 500 TABLET ORAL at 03:45

## 2024-05-11 RX ADMIN — OXYCODONE HYDROCHLORIDE 5 MG: 5 TABLET ORAL at 09:50

## 2024-05-11 RX ADMIN — DIGOXIN 125 MCG: 125 TABLET ORAL at 12:14

## 2024-05-11 RX ADMIN — APIXABAN 2.5 MG: 2.5 TABLET, FILM COATED ORAL at 09:42

## 2024-05-11 RX ADMIN — BISOPROLOL FUMARATE 5 MG: 5 TABLET ORAL at 09:42

## 2024-05-11 RX ADMIN — DULOXETINE HYDROCHLORIDE 60 MG: 60 CAPSULE, DELAYED RELEASE ORAL at 09:42

## 2024-05-11 RX ADMIN — DOCUSATE SODIUM 100 MG: 100 CAPSULE, LIQUID FILLED ORAL at 09:42

## 2024-05-11 NOTE — PROGRESS NOTES
[facility]    Acute pain service Inpatient Progress Note    Patient Name: Lesvia Madera  :  1945  MRN:  [id number]        Acute Pain  Service Inpatient Progress Note:    Analgesia:Good  Pain Score:3/10  LOC: alert and awake  Resp Status: room air  Cardiac: VS stable  Catheter Site:clean  Cath type: Epidural cath(MOOG pump)  Infusion rate: Fem/ Add: Basal: 1ml/hr, PIB: 8ml q 8h, PCA: 8ml q 30 min  Dosing/Volume: ropivacaine 0.2%  Catheter Plan:Catheter to remain Insitu

## 2024-05-11 NOTE — PROGRESS NOTES
"/87 (BP Location: Right arm, Patient Position: Sitting)   Pulse 90   Temp 98.2 °F (36.8 °C) (Oral)   Resp 18   Ht 162.6 cm (64\")   Wt 96.2 kg (212 lb)   SpO2 93%   BMI 36.39 kg/m²     Lab Results (last 24 hours)       Procedure Component Value Units Date/Time    POC Glucose Once [[id number]]  (Normal) Collected: 05/11/24 0723    Specimen: Blood Updated: 05/11/24 0724     Glucose 118 mg/dL     Basic Metabolic Panel [[id number]]  (Abnormal) Collected: 05/11/24 0337    Specimen: Blood Updated: 05/11/24 0525     Glucose 126 mg/dL      BUN 10 mg/dL      Creatinine 0.54 mg/dL      Sodium 141 mmol/L      Potassium 4.1 mmol/L      Chloride 108 mmol/L      CO2 25.0 mmol/L      Calcium 8.1 mg/dL      BUN/Creatinine Ratio 18.5     Anion Gap 8.0 mmol/L      eGFR 95.0 mL/min/1.73     Narrative:      GFR Normal >60  Chronic Kidney Disease <60  Kidney Failure <15    The GFR formula is only valid for adults with stable renal function between ages 18 and 70.    Magnesium [[id number]]  (Normal) Collected: 05/11/24 0337    Specimen: Blood Updated: 05/11/24 0525     Magnesium 2.0 mg/dL     Calcium, Ionized [[id number]]  (Normal) Collected: 05/11/24 0337    Specimen: Blood Updated: 05/11/24 0512     Ionized Calcium 1.16 mmol/L     CBC & Differential [[id number]]  (Abnormal) Collected: 05/11/24 0337    Specimen: Blood Updated: 05/11/24 0453    Narrative:      The following orders were created for panel order CBC & Differential.  Procedure                               Abnormality         Status                     ---------                               -----------         ------                     CBC Auto Differential[[id number]]        Abnormal            Final result                 Please view results for these tests on the individual orders.    CBC Auto Differential [[id number]]  (Abnormal) Collected: 05/11/24 0337    Specimen: Blood Updated: 05/11/24 0453     WBC 12.08 10*3/mm3      RBC 4.34 10*6/mm3      Hemoglobin 13.6 " g/dL      Hematocrit 42.9 %      MCV 98.8 fL      MCH 31.3 pg      MCHC 31.7 g/dL      RDW 13.5 %      RDW-SD 49.2 fl      MPV 9.2 fL      Platelets 186 10*3/mm3      Neutrophil % 74.0 %      Lymphocyte % 12.5 %      Monocyte % 12.2 %      Eosinophil % 0.5 %      Basophil % 0.2 %      Immature Grans % 0.6 %      Neutrophils, Absolute 8.94 10*3/mm3      Lymphocytes, Absolute 1.51 10*3/mm3      Monocytes, Absolute 1.47 10*3/mm3      Eosinophils, Absolute 0.06 10*3/mm3      Basophils, Absolute 0.03 10*3/mm3      Immature Grans, Absolute 0.07 10*3/mm3      nRBC 0.0 /100 WBC     POC Glucose Once [[id number]]  (Abnormal) Collected: 05/10/24 1916    Specimen: Blood Updated: 05/10/24 1918     Glucose 142 mg/dL     POC Glucose Once [[id number]]  (Normal) Collected: 05/10/24 1610    Specimen: Blood Updated: 05/10/24 1612     Glucose 99 mg/dL             Imaging Results (Last 24 Hours)       ** No results found for the last 24 hours. **            Patient Care Team:  Luigi Mckeon MD as PCP - General (General Practice)    SUBJECTIVE: She reports she is doing well.  Pain is well-controlled.  Wanting to go home today.    PHYSICAL EXAM  Left knee incision is clean, dry and intact with mesh dressing in place  Thigh and calf soft and nontender  Neurovascular intact distally       Status post left knee replacement    Primary localized osteoarthritis of left knee    Hypertension    A-fib    Diabetes      PLAN / DISPOSITION: 78-year-old female postop day #2 status post left total knee arthroplasty  -Continue to mobilize with therapy as tolerated  -Eliquis for DVT prophylaxis.  Okay to resume normal dose on Monday as long as incision remains dry.  -Okay to shower with mesh dressing in place once nerve catheter removed  -Plan discharge home today  -Follow-up in 2 to 3 weeks    Lauro Gomez Jr., MD  05/11/24  11:40 EDT

## 2024-05-11 NOTE — PLAN OF CARE
Patient c/o intermittent sore / incisional pain, exacerbated on movement / activities. Adequately controlled with PO analgesics. Transparent film CDI. Incision free of redness / edema. Mildly swelling. Ambulates to bathroom with one assist. Voids spontaneously without difficulty. SBP moderately elevated. Mildly tachy cardiac on activities. Call light in reach.

## 2024-05-11 NOTE — DISCHARGE SUMMARY
Patient Name: Lesvia Madera  MRN: [id number]  : 1945  DOS: 2024    Attending: Bar Olivera,*    Primary Care Provider: Luigi Mckeon MD    Date of Admission:.2024  7:31 AM    Date of Discharge:  2024    Discharge Diagnosis:   Status post left knee replacement    Primary localized osteoarthritis of left knee    Hypertension    A-fib    Diabetes      Hospital Course    At admit:  Patient is a pleasant 78 y.o. female presented for scheduled surgery by Dr. Olivera.  He underwent left total knee arthroplasty under spinal anesthesia.  She tolerated surgery well and was admitted for further medical management.  Her knee has been painful for over a year.  She uses a brace and cane with ambulation.  She denies recent falls.      After admit:    Patient was provided pain medications as needed for pain control, along with adductor canal nerve block infusion of Ropivacaine.    Adjustments were made to pain medications to optimize postop pain management. Risks and benefits of opiate medications discussed with patient. MELISSA report was reviewed.    She was seen by PT and OT and has progressed well over her stay.    She used an IS for atelectasis prophylaxis and Eliquis along with mechanicals for DVT prophylaxis.    Home medications were resumed as appropriate, and labs were monitored and remained fairly stable.     With the progress she has made, she is ready for DC home today.      She will have an Infupump ( instructed on it during this admit).    Patient brought her blood pressure and her lisinopril was discontinued, she was advised to continue to monitor blood pressure at home and if her systolics are increasing more than 142 restart lisinopril, she was also advised to follow-up with her primary care physician in 1 week    Discussed with patient regarding plan and she shows understanding and agreement.    Patient will have PT following discharge.          Procedures  "Performed  Procedure(s):  TOTAL KNEE ARTHROPLASTY WITH CORI ROBOT - LEFT       Pertinent Test Results:    I reviewed the patient's new clinical results.   Results from last 7 days   Lab Units 24  0337 05/10/24  0404   WBC 10*3/mm3 12.08* 12.14*   HEMOGLOBIN g/dL 13.6 11.8*   HEMATOCRIT % 42.9 38.5   PLATELETS 10*3/mm3 186 158     Results from last 7 days   Lab Units 24  0337 05/10/24  0404 24  0843   SODIUM mmol/L 141 144  --    POTASSIUM mmol/L 4.1 3.5 3.9   CHLORIDE mmol/L 108* 110*  --    CO2 mmol/L 25.0 19.0*  --    BUN mg/dL 10 11  --    CREATININE mg/dL 0.54* 0.86  --    CALCIUM mg/dL 8.1* 6.7*  --    GLUCOSE mg/dL 126* 124*  --      I reviewed the patient's new imaging including images and reports.      Physical therapy   Pt continues to present below baseline function d/t pain and deficits in L knee ROM/strength, balance, and activity tolerance. Pt ambulated 200 ft w/ FWW, CGA. No LOB or knee buckling noted. Pt would cont to benefit from skilled IP PT. Rec home w/ assist and HH PT at d/c.        Anticipated Discharge Disposition (PT): home with assist, home with home health        Discharge Assessment:       Visit Vitals  /87 (BP Location: Right arm, Patient Position: Sitting)   Pulse 90   Temp 98.2 °F (36.8 °C) (Oral)   Resp 18   Ht 162.6 cm (64\")   Wt 96.2 kg (212 lb)   SpO2 93%   BMI 36.39 kg/m²     Temp (24hrs), Av.2 °F (36.8 °C), Min:97.9 °F (36.6 °C), Max:98.6 °F (37 °C)      General Appearance:    Alert, cooperative, in no acute distress   Lungs:     Clear to auscultation,respirations regular, even and                   unlabored    Heart:    Regular rhythm and normal rate, normal S1 and S2   Abdomen:     Normal bowel sounds, no masses, no organomegaly, soft        non-tender, non-distended, no guarding, no rebound                 tenderness   Extremities:   CDI dressing surgical knee,  . ACB cath present, infupump.   Pulses:   Pulses palpable and equal bilaterally   Skin:  "  No bleeding, bruising or rash   Neurologic:   Cranial nerves 2 - 12 grossly intact, sensation intact, Flexion and dorsiflexion intact bilateral feet.         Discharge Disposition: Home    Discharge Medications     Discharge Medications        New Medications        Instructions Start Date   docusate sodium 100 MG capsule  Commonly known as: COLACE   100 mg, Oral, 2 Times Daily      oxyCODONE 5 MG immediate release tablet  Commonly known as: Roxicodone   5 mg, Oral, Every 4 Hours PRN             Changes to Medications        Instructions Start Date   apixaban 2.5 MG tablet tablet  Commonly known as: ELIQUIS  What changed:   medication strength  how much to take  when to take this   2.5 mg, Oral, Every 12 Hours Scheduled   Start Date: May 12, 2024            Continue These Medications        Instructions Start Date   ATORVASTATIN CALCIUM PO   Oral, Daily      bisoprolol 5 MG tablet  Commonly known as: ZEBeta   5 mg, Oral, Daily, 1.5 BID       CYMBALTA PO   Oral, Daily      digoxin 125 MCG tablet  Commonly known as: LANOXIN   125 mcg, Oral, Daily Digoxin      Farxiga 10 MG tablet  Generic drug: dapagliflozin Propanediol   Oral      TRULICITY SC   Subcutaneous, Weekly, WEDNESDAY              Stop These Medications      LISINOPRIL PO     SPIRONOLACTONE PO              Time spent 35 minutes    Activity at Discharge:     Follow-up Appointments:  No future appointments.      Dragon disclaimer:  Part of this encounter note is an electronic transcription/translation of spoken language to printed text. The electronic translation of spoken language may permit erroneous, or at times, nonsensical words or phrases to be inadvertently transcribed; Although I have reviewed the note for such errors, some may still exist.       Ha Mccrary MD  05/11/24  11:24 EDT

## 2024-05-11 NOTE — CASE MANAGEMENT/SOCIAL WORK
Case Management Discharge Note      Final Note: Home today with [facility] accepted for in home care to transition to [facility] program. [facility]. I called patient in the room and she has transportation  home and her DME to take home and I told her [facility] will call and schedule an appt either Sunday and if not then Monday.    Provided Post Acute Provider List?: Yes  Post Acute Provider List: Outpatient Therapy  Provided Post Acute Provider Quality & Resource List?: Yes  Delivered To: Patient  Method of Delivery: In person    Selected Continued Care - Admitted Since 5/9/2024       Destination    No services have been selected for the patient.                Durable Medical Equipment Coordination complete.      Service Provider Selected Services Address Phone Fax Patient Preferred    [facility] [place] [facility] [address] [facility] [address] [phone] --              Dialysis/Infusion    No services have been selected for the patient.                Home Medical Care    No services have been selected for the patient.                Therapy Coordination complete.      Service Provider Selected Services Address Phone Fax Patient Preferred    [facility] [address] [facility] [address] [phone] --              Community Resources    No services have been selected for the patient.                Community & DME    No services have been selected for the patient.                         Final Discharge Disposition Code: 01 - home or self-care

## 2024-05-11 NOTE — DISCHARGE INSTRUCTIONS
Nerve Catheter Removal Instructions  When your device is empty:    Remove your catheter by pulling the dressing off slowly (like you would remove a regular bandage). The catheter should pull right out of the skin.  Check that the BLUE tip is intact.                                                                                     If the catheter is stuck, reposition your   extremity and pull slowly until removed.  *If catheter is HURTING and WON'T come out, stop and call [phone] for further assistance.    Remove medication bag from the black carrying case.  Cut the tubing on right and left side of pump, and discard the medication bag and tubing into garbage.  Place the pump and black carrying case into the plastic bag and then place this into the return box.  Seal box with blue stickers and return to [place] postal service. THIS IS PRE-PAID POSTAGE.InfuBLOCK - Patient Information    What is a pain pump?  The InfuBLOCK pump delivers post-operative, non-narcotic, numbing medication to the nerve near the surgical site for pain relief.     Where can I find information about my pain pump?           For more information about your pain pump, scan the QR code.  For additional patient resources, visit [email or web address].                                                                                               While your physician is your primary source for information about your treatment there may be times during your treatment that you need assistance with your infusion pump.     If you need assistance take the following steps:    The [email or web address] Nursing Hotline is Here for You 24/7.  Please call [phone] for the following concerns or complications:    Answers to questions about your infusion pump                 Tubing disconnect  Assistance with pump alarms                                                      Dislodged catheter  Excessive leakage noted from pump                                          Inadequate pain control    2.   [facility] Anesthesia Acute Pain Service: [phone] is available 24/7 for any further needs or concerns about medication or pain control. SMI COLD THERAPY - PATIENT INSTRUCTION SHEET    Cold Compression Therapy for your comfort and rehabilitation  Your caregivers want you to be productive in your rehab and comfortable during your stay. In keeping with those goals, you will be receiving an SMI Cold Therapy Wrap to help ease post-operative pain and swelling that might keep you from getting back on track! Your SMI Cold Therapy Wrap is effective and simple-to-use, and you will be encouraged to apply it throughout your hospital stay and at home through the duration of your recovery.    When you are ready to go home  Be sure to take your SMI Cold Therapy Wrap and both sets of Gel Bags with you for continued comfort and use throughout your rehabilitation. If you don't already have them, ask your nurse or aide to retrieve your SMI Gel Bags from the patient freezer.    Home use precautions  Always follow your medical professional's application instructions upon discharge. Your SMI Cold Therapy Wrap and Gel Bags are designed to last for months following your surgery. Never heat the Gel Bags unless specified by your healthcare provider. Supervision is advised when using this product on children or geriatric patients. To avoid danger of suffocation, please keep the outer plastic packaging away from children & pets.    Cold Therapy Instructions  Place Gel Bags in a freezer set ¾ of the way to max temperature for at least (4) hours. For best results, lay the Gel Bags flat and [id number] in the freezer. Once frozen, slide Gel Bags into the gel pouch and secure your wrap to the affected area with the straps.  Gel wraps that have been stored in a freezer for an extended period of time may require a (10) minute period of softening up in a room temperature environment before  application.  The gel pouch acts as a protective barrier. NEVER place frozen bags directly onto skin, as this may cause frostbite injury.  The [facility] Cold Therapy Wrap is designed to be able to be worm while ambulating. The compression straps can be secured well enough so that the Wrap won't fall off while moving.  Wrap Application Videos can be viewed at [employer].  An additional protective barrier such as clothing, a washcloth, hand-towel or pillowcase may be used during prolonged treatment applications.  The Gel-Pouch and Wrap are both Latex-Free and the Gel Bag ingredients are non toxic.    SMI Wrap care instructions  The [facility] Cold Therapy Wrap may be hand washed and hung to dry when needed.    [facility] re-order information  Additional [facility] body specific wraps and/or Gel Bags can be re-ordered from [employer] or call [employer] ([phone])  “I received and reviewed a copy of the [facility] Joint Replacement Guide, understand the individualized plan of care and self-management training program developed and do not have any questions.”EXOFIN CARING FOR YOUR WOUND    AFTER exofin Fusion SKIN CLOSURE SYSTEM HAS BEEN APPLIED    YOUR HEALTHCARE PROFESSIONAL HAS CHOSEN TO USE exofin Fusion SKIN CLOSURE SYSTEM TO CLOSE YOUR WOUND.    exocin Fusion is the combination of a mesh and liquid adhesive that allows the incision or wound to be held together during the healing process.    exocin Fusion should remain in place until your healthcare professional; has determined that adequate healing has occurred, which is usually anywhere between 7 to 14 days. In most cases, exofin Fusion is easily removed with little or no discomfort.    In the event that you notice that exofin Fusion is beginning to loosen or may be coming off, contact your healthcare professional.    The following information is provided to help you understand how to care for your incision and is based on the FDA-cleared product labeling.    You  should always follow the instructions of your healthcare provider.    THINGS TO KNOW    BATHING OR SHOWERING    If directed by your healthcare professional, you may occasionally and briefly wet your incision or wound that was treated with exofin Fusion in the shower or bath. Do not soak or scrub your incision or wound. Do not swim or soak your incision or wound in water. After showering or bathing, gently blot your incision or wound dry with a soft towel. If a dry protective dressing is being used over exofin Fusion, it should be replaced with a fresh, dry protective dressing after showering or bathing as directed by your healthcare practitioner. Care should also be taken so that any tape that may be part of the dry protective dressing does not come into contact with exofin Fusion because when the tape is removed, it may also remove exofin Fusion.    WOUND HEALING  If you experience any redness, swelling, discomfort, warmth or pus, contact your healthcare professional and he or she will determine how your incision or wound is healing and take the necessary steps to address any issues.    EXERCISE  Do not engage in strenuous exercise that may cause additional stress on your incision or wound. Follow your healthcare professional's guidance about when you can return to your normal activities.    OINTMENTS OR LIQUIDS  Topical ointments, liquids or any other products (other than dry bandages) should not be applied to the incision while exofin Fusion is in place. These may loosen exofin Fusion from the skin before it has completely healed.    REMOVING exofin Fusion  Your healthcare professional will determine when the healing process of your incision or wound has been completed and exofin Fusion is ready to be removed, which is usually between 7 to 14 days. When healing is complete, your healthcare professional will carefully peel off the exofin Fusion.    Prior to removal, do not scratch, rub or pick at the mesh. This  may loosen the adhesive and mesh before the skin is healed.  In the event that you notice the exofin Fusion is beginning to loosen and may be coming off or comes off with the skin/wound, contract your healthcare professional.    For complete directions on the use of exofin Fusion, please refer to instructions for Use (IFU) included in the product packaging.  IF YOU HAVE ANY QUESTIONS OR CONCERNS ABOUT exofin Fusion PLEASE CONTACT YOUR HEALTHCARE PROFESSIONAL.

## 2024-05-11 NOTE — THERAPY TREATMENT NOTE
Patient Name: Lesvia Madera  : 1945    MRN: [id number]                              Today's Date: 2024       Admit Date: 2024    Visit Dx:     ICD-10-CM ICD-9-CM   1. Primary localized osteoarthritis of left knee  M17.12 715.16   2. Status post left knee replacement  Z96.652 V43.65     Patient Active Problem List   Diagnosis    Primary localized osteoarthritis of left knee    Status post left knee replacement    Hypertension    A-fib    Diabetes     Past Medical History:   Diagnosis Date    Arthritis     BILAT HANDS    Atrial fibrillation     Diabetes mellitus     Hypertension     Tailbone injury     PT STATES ITS CROOKED A BIT    Wears glasses     READERS     Past Surgical History:   Procedure Laterality Date    ANKLE SURGERY Right     PINS AND PLATES PLACED THEN REMOVED - NO HARDWARE IN PLACE NOW    BREAST BIOPSY Right     X1- NO MARKER    CATARACT EXTRACTION, BILATERAL Bilateral     TOTAL KNEE ARTHROPLASTY Left 2024    Procedure: TOTAL KNEE ARTHROPLASTY WITH CORI ROBOT - LEFT;  Surgeon: Bar Olivera MD;  Location: [facility];  Service: Robotics - Ortho;  Laterality: Left;    WISDOM TOOTH EXTRACTION        General Information       Row Name 24 1053          Physical Therapy Time and Intention    Document Type therapy note (daily note)  -     Mode of Treatment physical therapy  -       Row Name 24 1053          General Information    Patient Profile Reviewed yes  -     Existing Precautions/Restrictions fall;other (see comments)  adductor canal nerve cath  -     Barriers to Rehab none identified  -       Row Name 24 1053          Cognition    Orientation Status (Cognition) oriented x 4  -       Row Name 24 1053          Safety Issues, Functional Mobility    Safety Issues Affecting Function (Mobility) awareness of need for assistance;insight into deficits/self-awareness;safety precaution awareness;safety precautions follow-through/compliance   -     Impairments Affecting Function (Mobility) balance;endurance/activity tolerance;pain;range of motion (ROM);strength  -               User Key  (r) = Recorded By, (t) = Taken By, (c) = Cosigned By      Initials Name Provider Type     Marga Steve, PT Physical Therapist                   Mobility       Row Name 05/11/24 1054          Bed Mobility    Comment, (Bed Mobility) UIC  -[facility] Name 05/11/24 1054          Transfers    Comment, (Transfers) VCs for hand placement and sequencing w/ FWW. Reviewed nerve cath management to prevenet dislodgement  -[facility] Name 05/11/24 1054          Sit-Stand Transfer    Sit-Stand [place] (Transfers) contact guard;1 person assist;verbal cues  -     Assistive Device (Sit-Stand Transfers) walker, front-wheeled  -     Comment, (Sit-Stand Transfer) STS x1 from chair and toilet. Cues to step out L LE  -[facility] Name 05/11/24 1054          Gait/Stairs (Locomotion)    [place] Level (Gait) contact guard;verbal cues;1 person assist  -     Assistive Device (Gait) walker, front-wheeled  -     Patient was able to Ambulate yes  -     Distance in Feet (Gait) 200  -     Deviations/Abnormal Patterns (Gait) bilateral deviations;base of support, wide;weight shifting decreased;stride length decreased;gait speed decreased;antalgic  -     Bilateral Gait Deviations forward flexed posture;heel strike decreased  -     Left Sided Gait Deviations weight shift ability decreased  -     Comment, (Gait/Stairs) Pt demo step through gait pattern w/ decreased speed, step length, and L knee flexion. VCs for sequencing, upright posture, increased step length, increased knee flexion, and walker positioning. No LOB or knee buckling. Activity limited fatigue and pain  -[facility] Name 05/11/24 1054          Mobility    Extremity Weight-bearing Status left lower extremity  -     Left Lower Extremity (Weight-bearing Status) weight-bearing as tolerated (WBAT)  -                User Key  (r) = Recorded By, (t) = Taken By, (c) = Cosigned By      Initials Name Provider Type     Marga Steve PT Physical Therapist                   Obj/Interventions       [facility] Name 05/11/24 1059          Range of Motion Comprehensive    Comment, General Range of Motion L knee ROM: 8-75  -[facility] Name 05/11/24 1059          Motor Skills    Therapeutic Exercise knee;ankle  -LH       Row Name 05/11/24 1059          Knee (Therapeutic Exercise)    Knee (Therapeutic Exercise) isometric exercises;strengthening exercise;AROM (active range of motion)  -     Knee AROM (Therapeutic Exercise) left;flexion;sitting;10 repetitions  -     Knee Isometrics (Therapeutic Exercise) left;quad sets;10 repetitions  -     Knee Strengthening (Therapeutic Exercise) left;heel slides;SAQ (short arc quad);SLR (straight leg raise);LAQ (long arc quad);10 repetitions  -[facility] Name 05/11/24 1059          Ankle (Therapeutic Exercise)    Ankle (Therapeutic Exercise) AROM (active range of motion)  -     Ankle AROM (Therapeutic Exercise) bilateral;dorsiflexion;plantarflexion;10 repetitions  -LH       Row Name 05/11/24 1059          Balance    Balance Assessment sitting static balance;sitting dynamic balance;sit to stand dynamic balance;standing static balance;standing dynamic balance  -     Static Sitting Balance standby assist  -     Dynamic Sitting Balance standby assist  -     Position, Sitting Balance unsupported;sitting in chair  -     Sit to Stand Dynamic Balance contact guard;verbal cues  -     Static Standing Balance contact guard  -     Dynamic Standing Balance contact guard;1-person assist  -     Position/Device Used, Standing Balance supported;walker, front-wheeled  -     Balance Interventions sitting;standing;sit to stand;supported;static;dynamic  -               User Key  (r) = Recorded By, (t) = Taken By, (c) = Cosigned By      Initials Name Provider Type     Marga Steve  PT Physical Therapist                   Goals/Plan    No documentation.                  Clinical Impression       [facility] Name 05/11/24 1100          Pain    Pretreatment Pain Rating 0/10 - no pain  -     Posttreatment Pain Rating 5/10  -     Pain Location - Side/Orientation Left  -     Pain Location generalized  -     Pain Location - knee  -LH     Pain Intervention(s) Repositioned;Ambulation/increased activity;Cold pack;Cold applied;Elevated  -LH       Row Name 05/11/24 1100          Plan of Care Review    Plan of Care Reviewed With patient  -     Progress improving  -     Outcome Evaluation Pt continues to present below baseline function d/t pain and deficits in L knee ROM/strength, balance, and activity tolerance. Pt ambulated 200 ft w/ FWW, CGA. No LOB or knee buckling noted. Pt would cont to benefit from skilled IP PT. Rec home w/ assist and  PT at d/c.  -[facility] 05/11/24 1100          Vital Signs    Pre Systolic BP Rehab 126  -LH     Pre Treatment Diastolic BP 91  -LH     O2 Delivery Pre Treatment room air  -     O2 Delivery Intra Treatment room air  -     O2 Delivery Post Treatment room air  -     Pre Patient Position Sitting  -     Intra Patient Position Standing  -     Post Patient Position Sitting  -LH       Row Name 05/11/24 1100          Positioning and Restraints    Pre-Treatment Position sitting in chair/recliner  -     Post Treatment Position chair  -LH     In Chair notified nsg;reclined;sitting;call light within reach;encouraged to call for assist;exit alarm on;with family/caregiver;waffle cushion;legs elevated  -               User Key  (r) = Recorded By, (t) = Taken By, (c) = Cosigned By      Initials Name Provider Type     Marga Steve, PT Physical Therapist                   Outcome Measures       Row Name 05/11/24 1103 05/11/24 0855       How much help from another person do you currently need...    Turning from your back to your side while in flat bed  without using bedrails? 4  - 4  -BC    Moving from lying on back to sitting on the side of a flat bed without bedrails? 3  - 4  -BC    Moving to and from a bed to a chair (including a wheelchair)? 3  - 3  -BC    Standing up from a chair using your arms (e.g., wheelchair, bedside chair)? 3  - 3  -BC    Climbing 3-5 steps with a railing? 3  - 3  -BC    To walk in hospital room? 3  - 3  -BC    AM-PAC 6 Clicks Score (PT) 19  - 20  -BC    Highest Level of Mobility Goal 6 --> Walk 10 steps or more  - 6 --> Walk 10 steps or more  -BC      Row Name 05/11/24 1103          Functional Assessment    Outcome Measure Options AM-PAC 6 Clicks Basic Mobility (PT)  -               User Key  (r) = Recorded By, (t) = Taken By, (c) = Cosigned By      Initials Name Provider Type    BC Denise Hull, RN Registered Nurse     Marga Steve, PT Physical Therapist                                 Physical Therapy Education       Title: PT OT SLP Therapies (Done)       Topic: Physical Therapy (Done)       Point: Mobility training (Done)       Learning Progress Summary             Patient Acceptance, E, VU,NR by  at 5/11/2024 1103    Acceptance, E, VU by BC at 5/11/2024 0855    Acceptance, E,D, VU,NR by AB at 5/10/2024 1409    Acceptance, E,D, VU,DU by AB at 5/10/2024 1100    Acceptance, E,D, VU,DU by  at 5/9/2024 1825                         Point: Home exercise program (Done)       Learning Progress Summary             Patient Acceptance, E, VU,NR by  at 5/11/2024 1103    Acceptance, E, VU by BC at 5/11/2024 0855    Acceptance, E,D, VU,NR by AB at 5/10/2024 1409    Acceptance, E,D, VU,DU by AB at 5/10/2024 1100    Acceptance, E,D, VU,DU by  at 5/9/2024 1825                         Point: Body mechanics (Done)       Learning Progress Summary             Patient Acceptance, E, VU,NR by  at 5/11/2024 1103    Acceptance, E, VU by BC at 5/11/2024 0855    Acceptance, E,D, VU,NR by AB at 5/10/2024 1409    Acceptance,  E,D, VU,DU by AB at 5/10/2024 1100    Acceptance, E,D, VU,DU by  at 5/9/2024 1825                         Point: Precautions (Done)       Learning Progress Summary             Patient Acceptance, E, VU,NR by  at 5/11/2024 1103    Acceptance, E, VU by BC at 5/11/2024 0855    Acceptance, E,D, VU,NR by AB at 5/10/2024 1409    Acceptance, E,D, VU,DU by AB at 5/10/2024 1100    Acceptance, E,D, VU,DU by  at 5/9/2024 1825                                         User Key       Initials Effective Dates Name Provider Type Discipline    BC 04/27/21 -  Denise Hull, RN Registered Nurse Nurse     12/15/23 -  Dora Fitzgerald, PT Physical Therapist PT    AB 09/22/22 -  Mercedes Shaw PT Physical Therapist PT     09/21/23 -  Marga Steve PT Physical Therapist PT                  PT Recommendation and Plan     Plan of Care Reviewed With: patient  Progress: improving  Outcome Evaluation: Pt continues to present below baseline function d/t pain and deficits in L knee ROM/strength, balance, and activity tolerance. Pt ambulated 200 ft w/ FWW, CGA. No LOB or knee buckling noted. Pt would cont to benefit from skilled IP PT. Rec home w/ assist and HH PT at d/c.     Time Calculation:         PT Charges       Row Name 05/11/24 1103             Time Calculation    Start Time 0834  -      PT Received On 05/11/24  -      PT Goal Re-Cert Due Date 05/19/24  -         Timed Charges    36297 - PT Therapeutic Exercise Minutes 15  -      38779 - Gait Training Minutes  24  -         Total Minutes    Timed Charges Total Minutes 39  -       Total Minutes 39  -                User Key  (r) = Recorded By, (t) = Taken By, (c) = Cosigned By      Initials Name Provider Type     Marga Steve, PT Physical Therapist                  Therapy Charges for Today       Code Description Service Date Service Provider Modifiers Qty    33635613731 HC PT THER PROC EA 15 MIN 5/11/2024 Marga Steve, PT GP 1    [id number] HC GAIT  TRAINING EA 15 MIN 5/11/2024 Marga Steve, PT GP 2            PT G-Codes  Outcome Measure Options: AM-PAC 6 Clicks Basic Mobility (PT)  AM-PAC 6 Clicks Score (PT): 19  AM-PAC 6 Clicks Score (OT): 21  PT Discharge Summary  Anticipated Discharge Disposition (PT): home with assist, home with home health    Marga Steve, PT  5/11/2024

## 2024-05-11 NOTE — PLAN OF CARE
Goal Outcome Evaluation:  Plan of Care Reviewed With: patient        Progress: improving  Outcome Evaluation: Pt continues to present below baseline function d/t pain and deficits in L knee ROM/strength, balance, and activity tolerance. Pt ambulated 200 ft w/ FWW, CGA. No LOB or knee buckling noted. Pt would cont to benefit from skilled IP PT. Rec home w/ assist and HH PT at d/c.      Anticipated Discharge Disposition (PT): home with assist, home with home health

## 2024-09-23 ENCOUNTER — APPOINTMENT (OUTPATIENT)
Dept: CT IMAGING | Facility: HOSPITAL | Age: 79
End: 2024-09-23
Payer: MEDICARE

## 2024-09-23 ENCOUNTER — HOSPITAL ENCOUNTER (EMERGENCY)
Facility: HOSPITAL | Age: 79
Discharge: HOME | End: 2024-09-23
Attending: EMERGENCY MEDICINE | Admitting: EMERGENCY MEDICINE
Payer: MEDICARE

## 2024-09-23 VITALS
HEIGHT: 64 IN | WEIGHT: 211.64 LBS | OXYGEN SATURATION: 96 % | RESPIRATION RATE: 16 BRPM | HEART RATE: 90 BPM | TEMPERATURE: 98 F | BODY MASS INDEX: 36.13 KG/M2 | SYSTOLIC BLOOD PRESSURE: 137 MMHG | DIASTOLIC BLOOD PRESSURE: 97 MMHG
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
## 2024-09-23 DIAGNOSIS — Z79.01 CHRONIC ANTICOAGULATION: ICD-10-CM

## 2024-09-23 DIAGNOSIS — I62.00 SUBDURAL HEMORRHAGE: ICD-10-CM

## 2024-09-23 DIAGNOSIS — S00.81XA FACIAL ABRASION, INITIAL ENCOUNTER: ICD-10-CM

## 2024-09-23 DIAGNOSIS — S00.83XA FACIAL CONTUSION, INITIAL ENCOUNTER: ICD-10-CM

## 2024-09-23 PROCEDURE — 99284 EMERGENCY DEPT VISIT MOD MDM: CPT

## 2024-09-23 PROCEDURE — 70450 CT HEAD/BRAIN W/O DYE: CPT

## 2024-09-23 PROCEDURE — 70486 CT MAXILLOFACIAL W/O DYE: CPT

## 2024-09-23 RX ORDER — MELOXICAM 7.5 MG/1
7.5 TABLET ORAL DAILY
Qty: 5 TABLET | Refills: 0 | Status: SHIPPED | OUTPATIENT
Start: 2024-09-23

## 2024-09-23 RX ORDER — ACETAMINOPHEN 500 MG
1000 TABLET ORAL EVERY 6 HOURS PRN
Qty: 30 TABLET | Refills: 0 | Status: SHIPPED | OUTPATIENT
Start: 2024-09-23

## 2024-09-23 RX ORDER — ACETAMINOPHEN 500 MG
1000 TABLET ORAL ONCE
Status: COMPLETED | OUTPATIENT
Start: 2024-09-23 | End: 2024-09-23

## 2024-09-23 RX ADMIN — ACETAMINOPHEN 1000 MG: 500 TABLET ORAL at 11:53

## 2024-09-23 NOTE — ED PROVIDER NOTES
Subjective   History of Present Illness  Patient is a 79-year-old female who tripped and fell hitting her face and head while walking into the [employer] around 45 minutes prior to arrival.  Patient is on blood thinners.  No loss of consciousness.  Patient does report mild headache.  No other acute injuries reported.  No neck or back pain.    History provided by:  Patient and EMS personnel      Review of Systems    Past Medical History:   Diagnosis Date    Arthritis     BILAT HANDS    Atrial fibrillation     Diabetes mellitus     Hypertension     Tailbone injury     PT STATES ITS CROOKED A BIT    Wears glasses     READERS       Allergies   Allergen Reactions    Sulfamethoxazole-Trimethoprim Hives       Past Surgical History:   Procedure Laterality Date    ANKLE SURGERY Right     PINS AND PLATES PLACED THEN REMOVED - NO HARDWARE IN PLACE NOW    BREAST BIOPSY Right     X1- NO MARKER    CATARACT EXTRACTION, BILATERAL Bilateral     TOTAL KNEE ARTHROPLASTY Left 5/9/2024    Procedure: TOTAL KNEE ARTHROPLASTY WITH CORI ROBOT - LEFT;  Surgeon: Bar Olivera MD;  Location: [facility];  Service: Robotics - Ortho;  Laterality: Left;    WISDOM TOOTH EXTRACTION         No family history on file.    Social History     Socioeconomic History    Marital status: Single   Tobacco Use    Smoking status: Never    Smokeless tobacco: Never   Vaping Use    Vaping status: Never Used   Substance and Sexual Activity    Alcohol use: Not Currently    Drug use: Never    Sexual activity: Defer           Objective   Physical Exam  Vitals and nursing note reviewed.   Constitutional:       General: She is not in acute distress.     Appearance: Normal appearance. She is not toxic-appearing.   HENT:      Head: Normocephalic.      Comments: Contusion of the forehead just left of midline.  There is facial abrasion nasal abrasion noted as well.  No malocclusion.  No other injuries noted externally.  Eyes:      Extraocular Movements: Extraocular  movements intact.      Pupils: Pupils are equal, round, and reactive to light.   Cardiovascular:      Rate and Rhythm: Normal rate and regular rhythm.      Pulses: Normal pulses.   Pulmonary:      Effort: Pulmonary effort is normal. No respiratory distress.      Breath sounds: Normal breath sounds.   Musculoskeletal:         General: Normal range of motion.      Cervical back: No tenderness.   Skin:     General: Skin is warm and dry.   Neurological:      Mental Status: She is alert and oriented to person, place, and time.   Psychiatric:         Mood and Affect: Mood normal.         Behavior: Behavior normal.         Laceration Repair    Date/Time: 9/23/2024 12:00 PM    Performed by: Subhash Pope MD  Authorized by: Subhash Pope MD    Consent:     Consent obtained:  Verbal    Consent given by:  Patient    Risks discussed:  Need for additional repair, poor cosmetic result, poor wound healing and nerve damage  Universal protocol:     Procedure explained and questions answered to patient or proxy's satisfaction: yes      Imaging studies available: yes      Immediately prior to procedure, a time out was called: yes      Patient identity confirmed:  Verbally with patient  Anesthesia:     Anesthesia method:  None  Laceration details:     Location:  Face    Face location:  Forehead    Length (cm):  1 (skin tear/abrasion)  Exploration:     Imaging outcome: foreign body not noted      Wound exploration: wound explored through full range of motion and entire depth of wound visualized      Wound extent: no underlying fracture noted      Contaminated: no    Treatment:     Area cleansed with:  Chlorhexidine    Amount of cleaning:  Standard  Skin repair:     Repair method:  Tissue adhesive and Steri-Strips    Number of Steri-Strips:  1  Repair type:     Repair type:  Simple  Post-procedure details:     Procedure completion:  Tolerated well, no immediate complications  Comments:      I cleaned the wounds with  Hibiclens of the forehead and nose.  Tissue adhesive was used for couple of small lacerations.  The largest is a skin tear/abrasion of the central forehead.  Used tissue adhesive and a Steri-Strip in this region.  I talked with her about wound care prior to disposition.             ED Course  ED Course as of 09/23/24 1202   Mon Sep 23, 2024   1142 CT Head Without Contrast  I personally reviewed the CT images of the head and face.  On my interpretation there is a forehead contusion with hematoma.  Otherwise, there is no hemorrhage or mass effect visualized.  Mild left chronic maxillary sinusitis. [RS]   1145 Patient with evidence of mechanical fall.  Patient does report that her tetanus is up-to-date.  No emergent findings on her imaging studies other than the contusion and hematoma.  Discussed wound care with the patient.  No evidence of any intracranial pathology. I have reviewed results, considerations, and diagnosis with the patient and/or their representative. Anticipatory guidance provided. Follow-up plan reviewed. Precautions for acute return for re-evaluations also reviewed. This including potential for worsening of the presenting condition and need for further evaluation, admission, and/or intervention as indicated. Opportunity to as questions provided. I advised them to return for any concerns and stressed the importance of timely follow-up and outpatient services. They verbalized understanding.   [RS]   1145 Note to patient: The 21st Century Cures Act makes medical notes like these available to patients in the interest of transparency. However, be advised this is a medical document. It is intended as peer to peer communication. It is written in medical language and may contain abbreviations or verbiage that are unfamiliar. It may appear blunt or direct. Medical documents are intended to carry relevant information, facts as evident, and the clinical opinion of the physician/NPP.   [RS]   1158 I did talk  with the patient after wound care about the small area of potential subdural seen at the falx.  I talked with her about the potential for worsening.  I advised that if she has any concerns or worsening symptoms that she is to return to the emergency department for reevaluation and further imaging is indicated.  She voiced understanding and agreed. [RS]      ED Course User Index  [RS] Subhash Pope MD                                             Medical Decision Making  Problems Addressed:  Chronic anticoagulation: complicated acute illness or injury  Facial abrasion, initial encounter: complicated acute illness or injury  Facial contusion, initial encounter: complicated acute illness or injury  Fall, initial encounter: complicated acute illness or injury  Subdural hemorrhage: complicated acute illness or injury    Amount and/or Complexity of Data Reviewed  Independent Historian: EMS  Radiology: ordered. Decision-making details documented in ED Course.    Risk  OTC drugs.  Prescription drug management.        Final diagnoses:   Fall, initial encounter   Facial contusion, initial encounter   Facial abrasion, initial encounter   Chronic anticoagulation   Subdural hemorrhage       ED Disposition  ED Disposition       ED Disposition   Discharge    Condition   Stable    Comment   --               Luigi Mckeon MD  [address]  [facility] [address]  [place] [address]  [phone]    In 5 days  For wound re-check    [facility] EMERGENCY DEPARTMENT  [address] [facility] [address] [phone]    As needed, If symptoms worsen or ANY concerns.         Medication List        New Prescriptions      acetaminophen 500 MG tablet  Commonly known as: TYLENOL  Take 2 tablets by mouth Every 6 (Six) Hours As Needed for Mild Pain or Moderate Pain.     meloxicam 7.5 MG tablet  Commonly known as: MOBIC  Take 1 tablet by mouth Daily.               Where to Get Your Medications        These  medications were sent to [facility]pharmacy #[id number] - [place], [place] - [address] - [phone] PH - [phone] FX  [address], [facility] [address]      Hours: 24-hours Phone: [phone]   acetaminophen 500 MG tablet  meloxicam 7.5 MG tablet            Subhash Pope MD  09/23/24 1202

## 2025-02-27 ENCOUNTER — PRE-ADMISSION TESTING (OUTPATIENT)
Dept: PREADMISSION TESTING | Facility: HOSPITAL | Age: 80
End: 2025-02-27
Payer: MEDICARE

## 2025-02-27 VITALS — BODY MASS INDEX: 35.87 KG/M2 | WEIGHT: 210.1 LBS | HEIGHT: 64 IN

## 2025-02-27 LAB
ANION GAP SERPL CALCULATED.3IONS-SCNC: 11 MMOL/L (ref 5–15)
BUN SERPL-MCNC: 14 MG/DL (ref 8–23)
BUN/CREAT SERPL: 28 (ref 7–25)
CALCIUM SPEC-SCNC: 9.1 MG/DL (ref 8.6–10.5)
CHLORIDE SERPL-SCNC: 107 MMOL/L (ref 98–107)
CO2 SERPL-SCNC: 26 MMOL/L (ref 22–29)
CREAT SERPL-MCNC: 0.5 MG/DL (ref 0.57–1)
DEPRECATED RDW RBC AUTO: 48.7 FL (ref 37–54)
EGFRCR SERPLBLD CKD-EPI 2021: 96.1 ML/MIN/1.73
ERYTHROCYTE [DISTWIDTH] IN BLOOD BY AUTOMATED COUNT: 13.9 % (ref 12.3–15.4)
GLUCOSE SERPL-MCNC: 136 MG/DL (ref 65–99)
HBA1C MFR BLD: 6.7 % (ref 4.8–5.6)
HCT VFR BLD AUTO: 47.2 % (ref 34–46.6)
HGB BLD-MCNC: 15.3 G/DL (ref 12–15.9)
INR PPP: 1.08 (ref 0.89–1.12)
MCH RBC QN AUTO: 30.9 PG (ref 26.6–33)
MCHC RBC AUTO-ENTMCNC: 32.4 G/DL (ref 31.5–35.7)
MCV RBC AUTO: 95.4 FL (ref 79–97)
PLATELET # BLD AUTO: 241 10*3/MM3 (ref 140–450)
PMV BLD AUTO: 8.8 FL (ref 6–12)
POTASSIUM SERPL-SCNC: 4.9 MMOL/L (ref 3.5–5.2)
PROTHROMBIN TIME: 14.2 SECONDS (ref 12.2–14.5)
RBC # BLD AUTO: 4.95 10*6/MM3 (ref 3.77–5.28)
SODIUM SERPL-SCNC: 144 MMOL/L (ref 136–145)
WBC NRBC COR # BLD AUTO: 7.23 10*3/MM3 (ref 3.4–10.8)

## 2025-02-27 PROCEDURE — 36415 COLL VENOUS BLD VENIPUNCTURE: CPT

## 2025-02-27 PROCEDURE — 83036 HEMOGLOBIN GLYCOSYLATED A1C: CPT

## 2025-02-27 PROCEDURE — 85027 COMPLETE CBC AUTOMATED: CPT

## 2025-02-27 PROCEDURE — 93010 ELECTROCARDIOGRAM REPORT: CPT | Performed by: INTERNAL MEDICINE

## 2025-02-27 PROCEDURE — 93005 ELECTROCARDIOGRAM TRACING: CPT

## 2025-02-27 PROCEDURE — 80048 BASIC METABOLIC PNL TOTAL CA: CPT

## 2025-02-27 PROCEDURE — 85610 PROTHROMBIN TIME: CPT

## 2025-02-27 NOTE — PAT
An arrival time for procedure was not provided during PAT visit. If patient had any questions or concerns about their arrival time, they were instructed to contact their surgeon/physician.  Additionally, if the patient referred to an arrival time that was acquired from their my chart account, patient was encouraged to verify that time with their surgeon/physician. Arrival times are NOT provided in Pre Admission Testing Department.    Per Anesthesia Request, patient instructed not to take their ACE/ARB medications on the AM of surgery.    Patient instructed to drink 20 ounces of Gatorade or Gatorlyte (if diabetic) and it needs to be completed 1 hour (for Main OR patients) or 2 hours (scheduled  section & [facility] patients) before given arrival time for procedure (NO RED Gatorade and NO Gatorade Zero).    Patient verbalized understanding.    [employer] (by [email or web address]) pain pump patient informational handout given to patient.  Instructed patient to watch [employer] Patient Education Video regarding Peripheral Nerve Catheter that will be in place for upcoming surgery unless contraindicated. The video can be accessed using QR code noted on handout.  Patient agreed to watch video.  Stressed to patient to call [employer] Nursing Hotline  if patient has any questions or concerns after discharge.     Patient denies any current skin issues.     Patient to apply Chlorhexadine wipes  to surgical area (as instructed) the night before procedure and the AM of procedure. Wipes provided.    Patient viewed general PAT education video as instructed in their preoperative information received from their surgeon.  Patient stated the general PAT education video was viewed in its entirety and survey completed.  Copies of PAT general education handouts (Incentive Spirometry, Meds to Beds Program, Patient Belongings, Pre-op skin preparation instructions, Blood Glucose testing, Visitor policy, Surgery FAQ, Code H) distributed to patient  if not printed. Education related to the PAT pass and skin preparation for surgery (if applicable) completed in PAT as a reinforcement to PAT education video. Patient instructed to return PAT pass provided today as well as completed skin preparation sheet (if applicable) on the day of procedure.     Additionally if patient had not viewed video yet but intended to view it at home or in our waiting area, then referred them to the handout with QR code/link provided during PAT visit.  Encouraged patient/family to read Located within [facility] general education handouts thoroughly and notify PAT staff with any questions or concerns. Patient verbalized understanding of all information and priority content.    Discussed with patient options for receiving total joint replacement education and assessed patient's ability and preference. Joint Replacement Guide given to patient during PAT visit since not received a copy within the last year. Encouraged patient/family to read guide thoroughly and notify PAT staff with any questions or concerns. Handout provided directing patient to links to watch online videos related to joint replacement surgery on the [facility] website. The handout gives detailed instructions for joining an online joint replacement class through [employer] Teams or phone conference offered on the 1st and 3rd Thursdays of the month. Patient agreed to participate by watching videos online. Patient verbalized understanding of instructions. Encouraged to share information with family and/or . An overview of the joint replacement education was provided during the visit including general perioperative instructions that are routine for all surgical patients (PAT PASS, wipes, directions to pre-op, etc.).    PATIENT EKG ON CHART AND IN EPIC FROM 04/2024 AND 02/27/2025. PER PATIENT, SHE HAS A HX OD AFIB SINCE 2000.    PATIENT ADVISED TO CALL PRESCRIBING PROVIDER REGARDING BLOOD THINNER AND WHEN TO STOP TAKING MEDICATION TO PREPARE  FOR SURGERY ON 03/06/2025. PATIENT VERBALIZED UNDERSTANDING.

## 2025-02-28 LAB
QT INTERVAL: 376 MS
QTC INTERVAL: 397 MS

## 2025-03-05 ENCOUNTER — ANESTHESIA EVENT (OUTPATIENT)
Dept: PERIOP | Facility: HOSPITAL | Age: 80
End: 2025-03-05
Payer: MEDICARE

## 2025-03-05 RX ORDER — FAMOTIDINE 10 MG/ML
20 INJECTION, SOLUTION INTRAVENOUS ONCE
Status: CANCELLED | OUTPATIENT
Start: 2025-03-05 | End: 2025-03-05

## 2025-03-06 ENCOUNTER — ANESTHESIA (OUTPATIENT)
Dept: PERIOP | Facility: HOSPITAL | Age: 80
End: 2025-03-06
Payer: MEDICARE

## 2025-03-06 ENCOUNTER — HOSPITAL ENCOUNTER (OUTPATIENT)
Facility: HOSPITAL | Age: 80
Discharge: HOME | End: 2025-03-06
Attending: ORTHOPAEDIC SURGERY | Admitting: ORTHOPAEDIC SURGERY
Payer: MEDICARE

## 2025-03-06 ENCOUNTER — ANESTHESIA EVENT CONVERTED (OUTPATIENT)
Dept: ANESTHESIOLOGY | Facility: HOSPITAL | Age: 80
End: 2025-03-06
Payer: MEDICARE

## 2025-03-06 ENCOUNTER — APPOINTMENT (OUTPATIENT)
Dept: GENERAL RADIOLOGY | Facility: HOSPITAL | Age: 80
End: 2025-03-06
Payer: MEDICARE

## 2025-03-06 VITALS
SYSTOLIC BLOOD PRESSURE: 151 MMHG | RESPIRATION RATE: 18 BRPM | OXYGEN SATURATION: 97 % | HEART RATE: 72 BPM | DIASTOLIC BLOOD PRESSURE: 91 MMHG | TEMPERATURE: 97.6 F

## 2025-03-06 DIAGNOSIS — Z96.651 STATUS POST RIGHT KNEE REPLACEMENT: Primary | ICD-10-CM

## 2025-03-06 PROBLEM — M17.11 PRIMARY LOCALIZED OSTEOARTHRITIS OF RIGHT KNEE: Status: ACTIVE | Noted: 2025-03-06

## 2025-03-06 LAB — GLUCOSE BLDC GLUCOMTR-MCNC: 138 MG/DL (ref 70–130)

## 2025-03-06 PROCEDURE — 25010000002 PROPOFOL 10 MG/ML EMULSION: Performed by: ANESTHESIOLOGY

## 2025-03-06 PROCEDURE — 82948 REAGENT STRIP/BLOOD GLUCOSE: CPT

## 2025-03-06 PROCEDURE — 25010000002 ROPIVACAINE PER 1 MG: Performed by: ORTHOPAEDIC SURGERY

## 2025-03-06 PROCEDURE — 25010000002 ONDANSETRON PER 1 MG: Performed by: ANESTHESIOLOGY

## 2025-03-06 PROCEDURE — 97110 THERAPEUTIC EXERCISES: CPT

## 2025-03-06 PROCEDURE — C1776 JOINT DEVICE (IMPLANTABLE): HCPCS | Performed by: ORTHOPAEDIC SURGERY

## 2025-03-06 PROCEDURE — 25010000002 CEFAZOLIN PER 500 MG: Performed by: ORTHOPAEDIC SURGERY

## 2025-03-06 PROCEDURE — C1713 ANCHOR/SCREW BN/BN,TIS/BN: HCPCS | Performed by: ORTHOPAEDIC SURGERY

## 2025-03-06 PROCEDURE — 73560 X-RAY EXAM OF KNEE 1 OR 2: CPT

## 2025-03-06 PROCEDURE — 25010000002 BUPIVACAINE (PF) 0.25 % SOLUTION: Performed by: NURSE ANESTHETIST, CERTIFIED REGISTERED

## 2025-03-06 PROCEDURE — 25010000002 LIDOCAINE PF 1% 1 % SOLUTION: Performed by: ANESTHESIOLOGY

## 2025-03-06 PROCEDURE — 25010000002 MEPIVACAINE HCL (PF) 1.5 % SOLUTION: Performed by: NURSE ANESTHETIST, CERTIFIED REGISTERED

## 2025-03-06 PROCEDURE — 25010000002 ROPIVACAINE HCL-NACL 0.2-0.9 % SOLUTION: Performed by: NURSE ANESTHETIST, CERTIFIED REGISTERED

## 2025-03-06 PROCEDURE — 25810000003 LACTATED RINGERS PER 1000 ML: Performed by: ANESTHESIOLOGY

## 2025-03-06 PROCEDURE — 97161 PT EVAL LOW COMPLEX 20 MIN: CPT

## 2025-03-06 PROCEDURE — 25010000002 MORPHINE PER 10 MG: Performed by: ORTHOPAEDIC SURGERY

## 2025-03-06 PROCEDURE — 25010000002 DEXAMETHASONE PER 1 MG: Performed by: ANESTHESIOLOGY

## 2025-03-06 DEVICE — VIOLET ANTIBACTERIAL POLYDIOXANONE, KNOTLESS TISSUE CONTROL DEVICE
Type: IMPLANTABLE DEVICE | Site: KNEE | Status: FUNCTIONAL
Brand: STRATAFIX

## 2025-03-06 DEVICE — IMPLANTABLE DEVICE: Type: IMPLANTABLE DEVICE | Site: KNEE | Status: FUNCTIONAL

## 2025-03-06 DEVICE — STRATAFIX (SPIRAL MONOCRYL PLUS), BIDIRECTIONAL
Type: IMPLANTABLE DEVICE | Site: KNEE | Status: FUNCTIONAL
Brand: STRATAFIX

## 2025-03-06 DEVICE — PALACOS® R IS A FAST-CURING, RADIOPAQUE, POLY(METHYL METHACRYLATE)-BASED BONE CEMENT.PALACOS ® R CONTAINS THE X-RAY CONTRAST MEDIUM ZIRCONIUM DIOXIDE. TO IMPROVE VISIBILITY IN THE SURGICAL FIELD PALACOS ® R HAS BEEN COLOURED WITH CHLOROPHYLL (E141). THE BONE CEMENT IS PREPARED DIRECTLY BEFORE USE BY MIXING A POLYMER POWDER COMPONENT WITH A LIQUID MONOMER COMPONENT. A DUCTILE DOUGH FORMS WHICH CURES WITHIN A FEW MINUTES.
Type: IMPLANTABLE DEVICE | Site: KNEE | Status: FUNCTIONAL
Brand: PALACOS®

## 2025-03-06 DEVICE — GENESIS II RESURFACING PATELLAR                                    PROSTHESIS  32MM
Type: IMPLANTABLE DEVICE | Site: KNEE | Status: FUNCTIONAL
Brand: GENESIS II

## 2025-03-06 DEVICE — GENESIS II NON-POROUS TIBIAL                                    BASEPLATE SIZE 4 RIGHT
Type: IMPLANTABLE DEVICE | Site: KNEE | Status: FUNCTIONAL
Brand: GENESIS II

## 2025-03-06 DEVICE — LEGION NARROW POSTERIOR STABILIZED                                    OXINIUM SIZE 6N RIGHT
Type: IMPLANTABLE DEVICE | Site: KNEE | Status: FUNCTIONAL
Brand: LEGION

## 2025-03-06 DEVICE — LEGION POSTERIOR STABILIZED HIGH                                    FLEX HIGHLY CROSS LINKED                                    POLYETHYLENE SIZE 3-4 9MM
Type: IMPLANTABLE DEVICE | Site: KNEE | Status: FUNCTIONAL
Brand: LEGION

## 2025-03-06 RX ORDER — ONDANSETRON 4 MG/1
4 TABLET, ORALLY DISINTEGRATING ORAL EVERY 6 HOURS PRN
Status: CANCELLED | OUTPATIENT
Start: 2025-03-06

## 2025-03-06 RX ORDER — SODIUM CHLORIDE 0.9 % (FLUSH) 0.9 %
10 SYRINGE (ML) INJECTION EVERY 12 HOURS SCHEDULED
Status: DISCONTINUED | OUTPATIENT
Start: 2025-03-06 | End: 2025-03-06 | Stop reason: HOSPADM

## 2025-03-06 RX ORDER — ROPIVACAINE HYDROCHLORIDE 2 MG/ML
2 INJECTION, SOLUTION EPIDURAL; INFILTRATION; PERINEURAL CONTINUOUS
Start: 2025-03-06

## 2025-03-06 RX ORDER — SODIUM CHLORIDE 9 MG/ML
75 INJECTION, SOLUTION INTRAVENOUS CONTINUOUS
Status: CANCELLED | OUTPATIENT
Start: 2025-03-06 | End: 2025-03-06

## 2025-03-06 RX ORDER — BUPIVACAINE HCL/0.9 % NACL/PF 0.125 %
PLASTIC BAG, INJECTION (ML) EPIDURAL AS NEEDED
Status: DISCONTINUED | OUTPATIENT
Start: 2025-03-06 | End: 2025-03-06 | Stop reason: SURG

## 2025-03-06 RX ORDER — FAMOTIDINE 20 MG/1
20 TABLET, FILM COATED ORAL ONCE
Status: COMPLETED | OUTPATIENT
Start: 2025-03-06 | End: 2025-03-06

## 2025-03-06 RX ORDER — MAGNESIUM HYDROXIDE 1200 MG/15ML
LIQUID ORAL AS NEEDED
Status: DISCONTINUED | OUTPATIENT
Start: 2025-03-06 | End: 2025-03-06 | Stop reason: HOSPADM

## 2025-03-06 RX ORDER — BUPIVACAINE HYDROCHLORIDE 2.5 MG/ML
INJECTION, SOLUTION EPIDURAL; INFILTRATION; INTRACAUDAL
Status: COMPLETED | OUTPATIENT
Start: 2025-03-06 | End: 2025-03-06

## 2025-03-06 RX ORDER — BISACODYL 10 MG
10 SUPPOSITORY, RECTAL RECTAL DAILY PRN
Status: CANCELLED | OUTPATIENT
Start: 2025-03-06

## 2025-03-06 RX ORDER — ONDANSETRON 2 MG/ML
4 INJECTION INTRAMUSCULAR; INTRAVENOUS EVERY 6 HOURS PRN
Status: CANCELLED | OUTPATIENT
Start: 2025-03-06

## 2025-03-06 RX ORDER — OXYCODONE HYDROCHLORIDE 5 MG/1
5 TABLET ORAL EVERY 4 HOURS PRN
Qty: 30 TABLET | Refills: 0 | Status: SHIPPED | OUTPATIENT
Start: 2025-03-06

## 2025-03-06 RX ORDER — OXYCODONE HYDROCHLORIDE 5 MG/1
5 TABLET ORAL EVERY 4 HOURS PRN
Status: CANCELLED | OUTPATIENT
Start: 2025-03-06 | End: 2025-03-11

## 2025-03-06 RX ORDER — CEFAZOLIN SODIUM 1 G/3ML
INJECTION, POWDER, FOR SOLUTION INTRAMUSCULAR; INTRAVENOUS
Status: DISCONTINUED
Start: 2025-03-06 | End: 2025-03-06 | Stop reason: HOSPADM

## 2025-03-06 RX ORDER — ONDANSETRON 2 MG/ML
INJECTION INTRAMUSCULAR; INTRAVENOUS AS NEEDED
Status: DISCONTINUED | OUTPATIENT
Start: 2025-03-06 | End: 2025-03-06 | Stop reason: SURG

## 2025-03-06 RX ORDER — DIPHENHYDRAMINE HCL 25 MG
25 CAPSULE ORAL EVERY 6 HOURS PRN
Status: CANCELLED | OUTPATIENT
Start: 2025-03-06

## 2025-03-06 RX ORDER — ACETAMINOPHEN 500 MG
1000 TABLET ORAL EVERY 8 HOURS
Qty: 60 TABLET | Refills: 0 | Status: SHIPPED | OUTPATIENT
Start: 2025-03-06

## 2025-03-06 RX ORDER — OXYCODONE HYDROCHLORIDE 10 MG/1
10 TABLET ORAL EVERY 4 HOURS PRN
Status: CANCELLED | OUTPATIENT
Start: 2025-03-06 | End: 2025-03-11

## 2025-03-06 RX ORDER — HYDROMORPHONE HYDROCHLORIDE 1 MG/ML
0.5 INJECTION, SOLUTION INTRAMUSCULAR; INTRAVENOUS; SUBCUTANEOUS
Status: DISCONTINUED | OUTPATIENT
Start: 2025-03-06 | End: 2025-03-06 | Stop reason: HOSPADM

## 2025-03-06 RX ORDER — ROPIVACAINE HYDROCHLORIDE 2 MG/ML
INJECTION, SOLUTION EPIDURAL; INFILTRATION; PERINEURAL CONTINUOUS
Status: DISCONTINUED | OUTPATIENT
Start: 2025-03-06 | End: 2025-03-06 | Stop reason: HOSPADM

## 2025-03-06 RX ORDER — NALOXONE HCL 0.4 MG/ML
0.1 VIAL (ML) INJECTION
Status: CANCELLED | OUTPATIENT
Start: 2025-03-06

## 2025-03-06 RX ORDER — DOCUSATE SODIUM 100 MG/1
100 CAPSULE, LIQUID FILLED ORAL 2 TIMES DAILY PRN
Status: CANCELLED | OUTPATIENT
Start: 2025-03-06

## 2025-03-06 RX ORDER — ACETAMINOPHEN 500 MG
1000 TABLET ORAL EVERY 6 HOURS
Status: CANCELLED | OUTPATIENT
Start: 2025-03-06

## 2025-03-06 RX ORDER — TRANEXAMIC ACID 10 MG/ML
1000 INJECTION, SOLUTION INTRAVENOUS ONCE
Status: DISCONTINUED | OUTPATIENT
Start: 2025-03-06 | End: 2025-03-06 | Stop reason: HOSPADM

## 2025-03-06 RX ORDER — DOCUSATE SODIUM 100 MG/1
100 CAPSULE, LIQUID FILLED ORAL 2 TIMES DAILY
Qty: 30 CAPSULE | Refills: 0 | Status: SHIPPED | OUTPATIENT
Start: 2025-03-06 | End: 2025-03-21

## 2025-03-06 RX ORDER — SODIUM CHLORIDE, SODIUM LACTATE, POTASSIUM CHLORIDE, CALCIUM CHLORIDE 600; 310; 30; 20 MG/100ML; MG/100ML; MG/100ML; MG/100ML
INJECTION, SOLUTION INTRAVENOUS CONTINUOUS PRN
Status: DISCONTINUED | OUTPATIENT
Start: 2025-03-06 | End: 2025-03-06 | Stop reason: SURG

## 2025-03-06 RX ORDER — ONDANSETRON 2 MG/ML
4 INJECTION INTRAMUSCULAR; INTRAVENOUS ONCE AS NEEDED
Status: DISCONTINUED | OUTPATIENT
Start: 2025-03-06 | End: 2025-03-06 | Stop reason: HOSPADM

## 2025-03-06 RX ORDER — MIDAZOLAM HYDROCHLORIDE 1 MG/ML
0.5 INJECTION, SOLUTION INTRAMUSCULAR; INTRAVENOUS
Status: DISCONTINUED | OUTPATIENT
Start: 2025-03-06 | End: 2025-03-06 | Stop reason: HOSPADM

## 2025-03-06 RX ORDER — SODIUM CHLORIDE, SODIUM LACTATE, POTASSIUM CHLORIDE, CALCIUM CHLORIDE 600; 310; 30; 20 MG/100ML; MG/100ML; MG/100ML; MG/100ML
9 INJECTION, SOLUTION INTRAVENOUS CONTINUOUS
Status: DISCONTINUED | OUTPATIENT
Start: 2025-03-07 | End: 2025-03-06 | Stop reason: HOSPADM

## 2025-03-06 RX ORDER — AMOXICILLIN 250 MG
2 CAPSULE ORAL 2 TIMES DAILY PRN
Status: CANCELLED | OUTPATIENT
Start: 2025-03-06

## 2025-03-06 RX ORDER — FENTANYL CITRATE 50 UG/ML
50 INJECTION, SOLUTION INTRAMUSCULAR; INTRAVENOUS
Status: DISCONTINUED | OUTPATIENT
Start: 2025-03-06 | End: 2025-03-06 | Stop reason: HOSPADM

## 2025-03-06 RX ORDER — DIPHENHYDRAMINE HYDROCHLORIDE 50 MG/ML
25 INJECTION INTRAMUSCULAR; INTRAVENOUS EVERY 6 HOURS PRN
Status: CANCELLED | OUTPATIENT
Start: 2025-03-06

## 2025-03-06 RX ORDER — ACETAMINOPHEN 500 MG
1000 TABLET ORAL ONCE
Status: COMPLETED | OUTPATIENT
Start: 2025-03-06 | End: 2025-03-06

## 2025-03-06 RX ORDER — DEXAMETHASONE SODIUM PHOSPHATE 4 MG/ML
INJECTION, SOLUTION INTRA-ARTICULAR; INTRALESIONAL; INTRAMUSCULAR; INTRAVENOUS; SOFT TISSUE AS NEEDED
Status: DISCONTINUED | OUTPATIENT
Start: 2025-03-06 | End: 2025-03-06 | Stop reason: SURG

## 2025-03-06 RX ORDER — SODIUM CHLORIDE 9 MG/ML
INJECTION, SOLUTION INTRAVENOUS
Status: DISCONTINUED
Start: 2025-03-06 | End: 2025-03-06 | Stop reason: HOSPADM

## 2025-03-06 RX ORDER — LIDOCAINE HYDROCHLORIDE 10 MG/ML
INJECTION, SOLUTION EPIDURAL; INFILTRATION; INTRACAUDAL; PERINEURAL AS NEEDED
Status: DISCONTINUED | OUTPATIENT
Start: 2025-03-06 | End: 2025-03-06 | Stop reason: SURG

## 2025-03-06 RX ORDER — SODIUM CHLORIDE 0.9 % (FLUSH) 0.9 %
10 SYRINGE (ML) INJECTION AS NEEDED
Status: DISCONTINUED | OUTPATIENT
Start: 2025-03-06 | End: 2025-03-06 | Stop reason: HOSPADM

## 2025-03-06 RX ORDER — MELOXICAM 15 MG/1
15 TABLET ORAL ONCE
Status: COMPLETED | OUTPATIENT
Start: 2025-03-06 | End: 2025-03-06

## 2025-03-06 RX ORDER — LIDOCAINE HYDROCHLORIDE 10 MG/ML
0.5 INJECTION, SOLUTION EPIDURAL; INFILTRATION; INTRACAUDAL; PERINEURAL ONCE AS NEEDED
Status: COMPLETED | OUTPATIENT
Start: 2025-03-06 | End: 2025-03-06

## 2025-03-06 RX ORDER — KETOROLAC TROMETHAMINE 15 MG/ML
15 INJECTION, SOLUTION INTRAMUSCULAR; INTRAVENOUS EVERY 6 HOURS PRN
Status: CANCELLED | OUTPATIENT
Start: 2025-03-06 | End: 2025-03-10

## 2025-03-06 RX ORDER — PROPOFOL 10 MG/ML
VIAL (ML) INTRAVENOUS AS NEEDED
Status: DISCONTINUED | OUTPATIENT
Start: 2025-03-06 | End: 2025-03-06 | Stop reason: SURG

## 2025-03-06 RX ORDER — CEFAZOLIN SODIUM 2 G/100ML
2 INJECTION, SOLUTION INTRAVENOUS EVERY 8 HOURS
Status: CANCELLED | OUTPATIENT
Start: 2025-03-06 | End: 2025-03-07

## 2025-03-06 RX ORDER — BISACODYL 5 MG/1
10 TABLET, DELAYED RELEASE ORAL DAILY PRN
Status: CANCELLED | OUTPATIENT
Start: 2025-03-06

## 2025-03-06 RX ORDER — TRANEXAMIC ACID 10 MG/ML
1000 INJECTION, SOLUTION INTRAVENOUS ONCE
Status: COMPLETED | OUTPATIENT
Start: 2025-03-06 | End: 2025-03-06

## 2025-03-06 RX ADMIN — MEPIVACAINE HYDROCHLORIDE 4 ML: 15 INJECTION, SOLUTION EPIDURAL; INFILTRATION at 12:20

## 2025-03-06 RX ADMIN — SODIUM CHLORIDE 2000 MG: 900 INJECTION INTRAVENOUS at 12:18

## 2025-03-06 RX ADMIN — Medication 100 MCG: at 13:17

## 2025-03-06 RX ADMIN — Medication 100 MCG: at 13:32

## 2025-03-06 RX ADMIN — LIDOCAINE HYDROCHLORIDE 50 MG: 10 INJECTION, SOLUTION EPIDURAL; INFILTRATION; INTRACAUDAL; PERINEURAL at 12:18

## 2025-03-06 RX ADMIN — SODIUM CHLORIDE, POTASSIUM CHLORIDE, SODIUM LACTATE AND CALCIUM CHLORIDE: 600; 310; 30; 20 INJECTION, SOLUTION INTRAVENOUS at 12:14

## 2025-03-06 RX ADMIN — CEFAZOLIN 1000 MG: 1 INJECTION, POWDER, FOR SOLUTION INTRAMUSCULAR; INTRAVENOUS at 14:30

## 2025-03-06 RX ADMIN — MELOXICAM 15 MG: 15 TABLET ORAL at 10:32

## 2025-03-06 RX ADMIN — TRANEXAMIC ACID 1000 MG: 10 INJECTION, SOLUTION INTRAVENOUS at 12:22

## 2025-03-06 RX ADMIN — PROPOFOL 50 MG: 10 INJECTION, EMULSION INTRAVENOUS at 12:18

## 2025-03-06 RX ADMIN — DEXAMETHASONE SODIUM PHOSPHATE 4 MG: 4 INJECTION INTRA-ARTICULAR; INTRALESIONAL; INTRAMUSCULAR; INTRAVENOUS; SOFT TISSUE at 13:36

## 2025-03-06 RX ADMIN — FAMOTIDINE 20 MG: 20 TABLET, FILM COATED ORAL at 10:32

## 2025-03-06 RX ADMIN — SODIUM CHLORIDE, POTASSIUM CHLORIDE, SODIUM LACTATE AND CALCIUM CHLORIDE 9 ML/HR: 600; 310; 30; 20 INJECTION, SOLUTION INTRAVENOUS at 10:34

## 2025-03-06 RX ADMIN — ONDANSETRON 4 MG: 2 INJECTION INTRAMUSCULAR; INTRAVENOUS at 13:36

## 2025-03-06 RX ADMIN — Medication 1000 MG: at 14:24

## 2025-03-06 RX ADMIN — BUPIVACAINE HYDROCHLORIDE 20 ML: 2.5 INJECTION, SOLUTION EPIDURAL; INFILTRATION; INTRACAUDAL; PERINEURAL at 14:05

## 2025-03-06 RX ADMIN — ACETAMINOPHEN 1000 MG: 500 TABLET ORAL at 10:32

## 2025-03-06 RX ADMIN — LIDOCAINE HYDROCHLORIDE 0.2 ML: 10 INJECTION, SOLUTION EPIDURAL; INFILTRATION; INTRACAUDAL; PERINEURAL at 10:34

## 2025-03-06 RX ADMIN — PROPOFOL 75 MCG/KG/MIN: 10 INJECTION, EMULSION INTRAVENOUS at 12:21

## 2025-03-06 NOTE — CASE MANAGEMENT/SOCIAL WORK
Continued Stay Note  [facility]     Patient Name: Lesvia Madera  MRN: [id number]  Today's Date: 3/6/2025    Admit Date: 3/6/2025    Plan: Home   Discharge Plan       Row Name 03/06/25 0890       Plan    Plan Home    Plan Comments Same day surgery-MSW provided pt. with a rolling walker. Pt. has PT arranged with [facility] in [place]. No other needs or concerns. MSW is available.    Final Discharge Disposition Code 01 - home or self-care                   Discharge Codes    No documentation.                 Expected Discharge Date and Time       Expected Discharge Date Expected Discharge Time    Mar 6, 2025               FRANCHESCA Sousa

## 2025-03-06 NOTE — NURSING NOTE
Caregiver Telephone Number    Phone Number for Ride/Caregiver: DANA STRICKLAND,SON,[phone]     Who will be staying with you post procedure for the next 24 hours? Name and Phone Number?: DANA STRICKLAND,SON,[phone]

## 2025-03-06 NOTE — THERAPY EVALUATION
Patient Name: Lesvia Madera  : 1945    MRN: [id number]                              Today's Date: 3/6/2025       Admit Date: 3/6/2025    Visit Dx:     ICD-10-CM ICD-9-CM   1. Status post right knee replacement  Z96.651 V43.65     Patient Active Problem List   Diagnosis    Primary localized osteoarthritis of left knee    Status post left knee replacement    Hypertension    A-fib    Diabetes    Primary localized osteoarthritis of right knee    Status post right knee replacement     Past Medical History:   Diagnosis Date    Arthritis     BILAT HANDS    Atrial fibrillation 2000    Diabetes mellitus     Hyperlipidemia     Hypertension     Tailbone injury     PT STATES ITS CROOKED A BIT    Wears glasses     READERS     Past Surgical History:   Procedure Laterality Date    ANKLE SURGERY Right     PINS AND PLATES PLACED THEN REMOVED - NO HARDWARE IN PLACE NOW    BREAST BIOPSY Right     X1- NO MARKER    CATARACT EXTRACTION, BILATERAL Bilateral     TOTAL KNEE ARTHROPLASTY Left 2024    Procedure: TOTAL KNEE ARTHROPLASTY WITH CORI ROBOT - LEFT;  Surgeon: Bar Olivera MD;  Location: [facility];  Service: Robotics - Ortho;  Laterality: Left;    WISDOM TOOTH EXTRACTION        General Information       Row Name 25 1734          Physical Therapy Time and Intention    Document Type evaluation (P)   -AP     Mode of Treatment physical therapy (P)   -AP       Row Name 25 1734          General Information    Patient Profile Reviewed yes (P)   -AP     Prior Level of Function independent:;all household mobility;community mobility;gait;transfer;bed mobility;dressing;bathing;driving;using stairs (P)   -AP     Existing Precautions/Restrictions fall;right;weight bearing;other (see comments) (P)   WBAT s/p R TKA; adductor canal nerve catheter  -AP     Barriers to Rehab none identified (P)   -AP       Row Name 25 1734          Living Environment    People in Home alone;other (see comments) (P)    Lives alone, but son staying with pt temporarily post-op to care for her  -AP       Row Name 03/06/25 1734          Home Main Entrance    Number of Stairs, Main Entrance one (P)   -AP     Stair Railings, Main Entrance none (P)   -AP       Row Name 03/06/25 1734          Stairs Within Home, Primary    Number of Stairs, Within Home, Primary none (P)   -AP       Row Name 03/06/25 1734          Cognition    Orientation Status (Cognition) oriented x 4 (P)   -AP       Row Name 03/06/25 1734          Safety Issues/Impairments Affecting Functional Mobility    Safety Issues Affecting Function (Mobility) awareness of need for assistance;insight into deficits/self-awareness;positioning of assistive device;safety precaution awareness;safety precautions follow-through/compliance;problem-solving;sequencing abilities (P)   -AP     Impairments Affecting Function (Mobility) balance;coordination;endurance/activity tolerance;motor control;pain;postural/trunk control;range of motion (ROM);strength (P)   -AP               User Key  (r) = Recorded By, (t) = Taken By, (c) = Cosigned By      Initials Name Provider Type    AP Chika Oviedo, PT Student PT Student                   Mobility       Row Name 03/06/25 1737          Bed Mobility    Bed Mobility supine-sit;sit-supine (P)   -AP     Supine-Sit [place] (Bed Mobility) verbal cues;contact guard;1 person assist (P)   -AP     Sit-Supine [place] (Bed Mobility) verbal cues;1 person assist;minimum assist (75% patient effort) (P)   -AP     Assistive Device (Bed Mobility) bed rails;head of bed elevated (P)   -AP     Comment, (Bed Mobility) Completed with increased time; Min A for sit>supine for management of RLE (P)   -AP       Row Name 03/06/25 1737          Sit-Stand Transfer    Sit-Stand [place] (Transfers) contact guard;verbal cues;2 person assist (P)   -AP     Assistive Device (Sit-Stand Transfers) walker, front-wheeled (P)   -AP     Comment, (Sit-Stand Transfer) VCs for  proper hand placement and extending RLE with stand>sit for comfort. Completed from EOB x1, commode x1 (P)   -AP       Row Name 03/06/25 2157 03/06/25 1618       Gait/Stairs (Locomotion)    [place] Level (Gait) verbal cues;contact guard;2 person assist (P)   -AP --    Assistive Device (Gait) walker, front-wheeled (P)   -AP --    Patient was able to Ambulate yes (P)   -AP yes  -HW    Distance in Feet (Gait) 220 (P)   -  -HW    Deviations/Abnormal Patterns (Gait) bilateral deviations;antalgic;base of support, narrow;valentín decreased;gait speed decreased;stride length decreased (P)   -AP --    Bilateral Gait Deviations forward flexed posture;heel strike decreased (P)   -AP --    Right Sided Gait Deviations weight shift ability decreased;leans right (P)   -AP --    Maintains Weight-bearing Status (Gait) able to maintain (P)   -AP --    [place] Level (Stairs) verbal cues;contact guard;2 person assist (P)   -AP --    Assistive Device (Stairs) walker, front-wheeled (P)   -AP --    Handrail Location (Stairs) none (P)   -AP --    Number of Steps (Stairs) 1 (P)   -AP --    Ascending Technique (Stairs) step-to-step (P)   -AP --    Descending Technique (Stairs) step-to-step (P)   -AP --    Stairs, Safety Issues sequencing ability decreased;weight-shifting ability decreased (P)   -AP --    Stairs, Impairments ROM decreased;strength decreased;impaired balance;coordination impaired;postural control impaired (P)   -AP --    Comment, (Gait/Stairs) Ambulated ~220' with notably decreased valentín/gait speed to begin gait training, eventually progressing to good step-through pattern with decreased reliance on BUE support thru FWW. VCs for increased stride length and upright posture. Navigated 1 step ascending posteriorly with FWW, requiring VCs for AD/LE sequencing. No overt LOB or knee buckling noted. (P)   -AP --      Row Name 03/06/25 3812          Mobility    Extremity Weight-bearing Status right lower extremity (P)    -AP     Right Lower Extremity (Weight-bearing Status) weight-bearing as tolerated (WBAT) (P)   -AP               User Key  (r) = Recorded By, (t) = Taken By, (c) = Cosigned By      Initials Name Provider Type    HW Dora Fitzgerald, PT Physical Therapist    AP Chika vOiedo PT Student PT Student                   Obj/Interventions       Row Name 03/06/25 1742          Range of Motion Comprehensive    General Range of Motion lower extremity range of motion deficits identified (P)   -AP     Comment, General Range of Motion R knee AROM: 10-70 (P)   -AP       Row Name 03/06/25 1742          Strength Comprehensive (MMT)    Comment, General Manual Muscle Testing (MMT) Assessment Pt able to complete B active ankle DF and SLR (P)   -AP       Row Name 03/06/25 1742          Motor Skills    Therapeutic Exercise knee;ankle (P)   -AP       Row Name 03/06/25 1742          Knee (Therapeutic Exercise)    Knee (Therapeutic Exercise) isometric exercises;strengthening exercise (P)   -AP     Knee Isometrics (Therapeutic Exercise) quad sets;right;supine;3 second hold;10 repetitions (P)   -AP     Knee Strengthening (Therapeutic Exercise) SLR (straight leg raise);SAQ (short arc quad);heel slides;right;supine;LAQ (long arc quad);sitting;10 repetitions (P)   -AP       Row Name 03/06/25 1742          Ankle (Therapeutic Exercise)    Ankle (Therapeutic Exercise) AROM (active range of motion) (P)   -AP     Ankle AROM (Therapeutic Exercise) bilateral;dorsiflexion;plantarflexion;supine;10 repetitions (P)   -AP       Row Name 03/06/25 1742          Balance    Balance Assessment sitting static balance;sitting dynamic balance;standing static balance;standing dynamic balance (P)   -AP     Static Sitting Balance standby assist;1-person assist (P)   -AP     Dynamic Sitting Balance standby assist;1-person assist (P)   -AP     Position, Sitting Balance unsupported;sitting edge of bed (P)   -AP     Static Standing Balance contact guard;1-person assist  (P)   -AP     Dynamic Standing Balance contact guard;2-person assist (P)   -AP     Position/Device Used, Standing Balance supported;walker, front-wheeled (P)   -AP     Balance Interventions sitting;standing;sit to stand;supported;dynamic;minimal challenge;occupation based/functional task (P)   -AP     Comment, Balance No overt LOB or knee buckling during gait/step training. Pt steady throughout (P)   -AP       Row Name 03/06/25 1742          Sensory Assessment (Somatosensory)    Sensory Assessment (Somatosensory) LE sensation intact (P)   -AP               User Key  (r) = Recorded By, (t) = Taken By, (c) = Cosigned By      Initials Name Provider Type    AP Chika Oviedo, PT Student PT Student                   Goals/Plan       Row Name 03/06/25 1748          Bed Mobility Goal 1 (PT)    Activity/Assistive Device (Bed Mobility Goal 1, PT) sit to supine/supine to sit (P)   -AP     [place] Level/Cues Needed (Bed Mobility Goal 1, PT) independent (P)   -AP     Time Frame (Bed Mobility Goal 1, PT) short term goal (STG);1 day (P)   -AP       Row Name 03/06/25 1748          Transfer Goal 1 (PT)    Activity/Assistive Device (Transfer Goal 1, PT) sit-to-stand/stand-to-sit (P)   -AP     [place] Level/Cues Needed (Transfer Goal 1, PT) modified independence (P)   with use of FWW  -AP     Time Frame (Transfer Goal 1, PT) short term goal (STG);1 day (P)   -AP       Row Name 03/06/25 1748          Gait Training Goal 1 (PT)    Activity/Assistive Device (Gait Training Goal 1, PT) gait (walking locomotion) (P)   -AP     [place] Level (Gait Training Goal 1, PT) modified independence (P)   with use of FWW  -AP     Distance (Gait Training Goal 1, PT) 250' (P)   -AP     Time Frame (Gait Training Goal 1, PT) long term goal (LTG);3 days (P)   -AP       Row Name 03/06/25 1748          ROM Goal 1 (PT)    ROM Goal 1 (PT) R knee AROM: 0-90 (P)   -AP     Time Frame (ROM Goal 1, PT) long-term goal (LTG);3 days (P)   -AP       Row  Name 03/06/25 1748          Stairs Goal 1 (PT)    Activity/Assistive Device (Stairs Goal 1, PT) ascending stairs;descending stairs;step-to-step (P)   -AP     [place] Level/Cues Needed (Stairs Goal 1, PT) modified independence (P)   with use of FWW  -AP     Number of Stairs (Stairs Goal 1, PT) 1 (P)   -AP     Time Frame (Stairs Goal 1, PT) long term goal (LTG);3 days (P)   -AP       Row Name 03/06/25 1742          Therapy Assessment/Plan (PT)    Planned Therapy Interventions (PT) balance training;bed mobility training;gait training;home exercise program;ROM (range of motion);patient/family education;stair training;strengthening;stretching;transfer training (P)   -AP               User Key  (r) = Recorded By, (t) = Taken By, (c) = Cosigned By      Initials Name Provider Type    AP Chika Oviedo, PT Student PT Student                   Clinical Impression       Row Name 03/06/25 1748          Pain    Pretreatment Pain Rating 0/10 - no pain (P)   -AP     Posttreatment Pain Rating 0/10 - no pain (P)   -AP       Row Name 03/06/25 174          Plan of Care Review    Plan of Care Reviewed With patient (P)   -AP     Progress no change (P)   -AP     Outcome Evaluation Pt presents POD#0 following R TKA. Ambulated ~220' with FWW and CGA x2, progressing to good step-through gait pattern. Navigated 1 step ascending posteriorly with FWW and CGA x2, requiring VCs for LE/AD sequencing. Pt steady throughout session with no overt LOB or knee buckling. Pt cleared for d/c home from PT standpoint. HEP reviewed at bedside and frequent ambulation encouraged. When medically appropriate, recommend d/c home with assist from son and home health PT services; pt agreeable. (P)   -AP       Row Name 03/06/25 1746          Therapy Assessment/Plan (PT)    Patient/Family Therapy Goals Statement (PT) D/c home with support from son (P)   -AP     Rehab Potential (PT) good (P)   -AP     Criteria for Skilled Interventions Met (PT) yes;meets  criteria;skilled treatment is necessary (P)   -AP     Therapy Frequency (PT) 2 times/day (P)   -AP     Predicted Duration of Therapy Intervention (PT) 1 day (P)   -AP       Row Name 03/06/25 1744          Positioning and Restraints    Pre-Treatment Position in bed (P)   -AP     Post Treatment Position bed (P)   in PACU with nursing  -AP     In Bed notified nsg;supine;encouraged to call for assist;patient within staff view;with nsg;side rails up x2;RLE elevated (P)   -AP               User Key  (r) = Recorded By, (t) = Taken By, (c) = Cosigned By      Initials Name Provider Type    AP Chika Oviedo, PT Student PT Student                   Outcome Measures       Row Name 03/06/25 1749          How much help from another person do you currently need...    Turning from your back to your side while in flat bed without using bedrails? 4 (P)   -AP     Moving from lying on back to sitting on the side of a flat bed without bedrails? 3 (P)   -AP     Moving to and from a bed to a chair (including a wheelchair)? 3 (P)   -AP     Standing up from a chair using your arms (e.g., wheelchair, bedside chair)? 3 (P)   -AP     Climbing 3-5 steps with a railing? 2 (P)   -AP     To walk in hospital room? 3 (P)   -AP     AM-PAC 6 Clicks Score (PT) 18 (P)   -AP     Highest Level of Mobility Goal 6 --> Walk 10 steps or more (P)   -AP       Row Name 03/06/25 1749          PADD    Diagnosis 1 (P)   -AP     Gender 1 (P)   -AP     Age Group 0 (P)   -AP     Gait Distance 1 (P)   -AP     Assist Level 1 (P)   -AP     Home Support 3 (P)   temporary support from son  -AP     PADD Score 7 (P)   -AP     Patient Preference home with home health (P)   -AP     Prediction by PADD Score extended rehabilitation (P)   -AP       Row Name 03/06/25 3519          Functional Assessment    Outcome Measure Options AM-PAC 6 Clicks Basic Mobility (PT);PADD (P)   -AP               User Key  (r) = Recorded By, (t) = Taken By, (c) = Cosigned By      Initials Name  Provider Type    AP Chika Oviedo, PT Student PT Student                                 Physical Therapy Education       Title: PT OT SLP Therapies (Done)       Topic: Physical Therapy (Done)       Point: Mobility training (Done)       Learning Progress Summary            Patient Acceptance, E,TB,H, VU,DU by  at 3/6/2025 1750                      Point: Home exercise program (Done)       Learning Progress Summary            Patient Acceptance, E,TB,H, VU,DU by  at 3/6/2025 1750                      Point: Body mechanics (Done)       Learning Progress Summary            Patient Acceptance, E,TB,H, VU,DU by  at 3/6/2025 1750                      Point: Precautions (Done)       Learning Progress Summary            Patient Acceptance, E,TB,H, VU,DU by  at 3/6/2025 1750                                      User Key       Initials Effective Dates Name Provider Type Discipline     01/09/25 -  Chika Oviedo, PT Student PT Student PT                  PT Recommendation and Plan  Planned Therapy Interventions (PT): (P) balance training, bed mobility training, gait training, home exercise program, ROM (range of motion), patient/family education, stair training, strengthening, stretching, transfer training  Progress: (P) no change  Outcome Evaluation: (P) Pt presents POD#0 following R TKA. Ambulated ~220' with FWW and CGA x2, progressing to good step-through gait pattern. Navigated 1 step ascending posteriorly with FWW and CGA x2, requiring VCs for LE/AD sequencing. Pt steady throughout session with no overt LOB or knee buckling. Pt cleared for d/c home from PT standpoint. HEP reviewed at bedside and frequent ambulation encouraged. When medically appropriate, recommend d/c home with assist from [facility] and home health PT services; pt agreeable.     Time Calculation:   PT Evaluation Complexity  History, PT Evaluation Complexity: (P) 1-2 personal factors and/or comorbidities  Examination of Body Systems (PT Eval  Complexity): (P) 1-2 elements  Clinical Presentation (PT Evaluation Complexity): (P) stable  Clinical Decision Making (PT Evaluation Complexity): (P) low complexity  Overall Complexity (PT Evaluation Complexity): (P) low complexity     PT Charges       Row Name 03/06/25 1750             Time Calculation    Start Time 1530 (P)   -AP      PT Received On 03/06/25 (P)   -AP      PT Goal Re-Cert Due Date 03/16/25 (P)   -AP         Timed Charges    36680 - PT Therapeutic Exercise Minutes 10 (P)   -AP         Untimed Charges    PT Eval/Re-eval Minutes 53 (P)   -AP         Total Minutes    Timed Charges Total Minutes 10 (P)   -AP      Untimed Charges Total Minutes 53 (P)   -AP       Total Minutes 63 (P)   -AP                User Key  (r) = Recorded By, (t) = Taken By, (c) = Cosigned By      Initials Name Provider Type    AP Chika Oviedo, PT Student PT Student                  Therapy Charges for Today       Code Description Service Date Service Provider Modifiers Qty    91395053438 HC PT EVAL LOW COMPLEXITY 4 3/6/2025 Chika Oviedo, PT Student GP 1    51759088953 HC PT THER PROC EA 15 MIN 3/6/2025 Chika Oviedo, PT Student GP 1    18793276066 HC PT THER SUPP EA 15 MIN 3/6/2025 Chika Oviedo, PT Student GP 1            PT G-Codes  Outcome Measure Options: (P) AM-PAC 6 Clicks Basic Mobility (PT), PADD  AM-PAC 6 Clicks Score (PT): (P) 18  PT Discharge Summary  Anticipated Discharge Disposition (PT): (P) home with assist, home with home health    Chika Oviedo PT Student  3/6/2025

## 2025-03-06 NOTE — ANESTHESIA POSTPROCEDURE EVALUATION
Patient: Lesvia Madera    Procedure Summary       Date: 03/06/25 Room / Location:  [facility]  /  [facility]    Anesthesia Start: 1214 Anesthesia Stop: 1408    Procedure: TOTAL KNEE ARTHROPLASTY WITH CORI ROBOT RIGHT (Right: Knee) Diagnosis:     Surgeons: Bar Olivera MD Provider: John Reyes Jr., MD    Anesthesia Type: spinal ASA Status: 3            Anesthesia Type: spinal    Vitals  Vitals Value Taken Time   /75 03/06/25 1404   Temp     Pulse 71 03/06/25 1406   Resp     SpO2 96 % 03/06/25 1406   Vitals shown include unfiled device data.        Post Anesthesia Care and Evaluation    Patient location during evaluation: PACU  Patient participation: complete - patient participated  Level of consciousness: awake and alert  Pain score: 0  Pain management: adequate    Airway patency: patent  Anesthetic complications: No anesthetic complications  PONV Status: none  Cardiovascular status: hemodynamically stable and acceptable  Respiratory status: nonlabored ventilation, acceptable and nasal cannula  Hydration status: acceptable  Post Neuraxial Block status: No signs or symptoms of PDPH

## 2025-03-06 NOTE — ANESTHESIA PROCEDURE NOTES
Spinal Block      Patient reassessed immediately prior to procedure    Patient location during procedure: OR  Indication:at surgeon's request  Performed By  QUENTIN/CAA: Chung Paez CRNASRNA: Gita Mcdonald SRNA  Preanesthetic Checklist  Completed: patient identified, IV checked, site marked, risks and benefits discussed, surgical consent, monitors and equipment checked, pre-op evaluation and timeout performed  Spinal Block Prep:  Patient Position:sitting  Sterile Tech:cap, gloves, sterile barriers and mask  Prep:Chloraprep  Patient Monitoring:blood pressure monitoring, continuous pulse oximetry and EKG    Spinal Block Procedure  Approach:midline  Guidance:landmark technique and palpation technique  Location:L4-L5  Needle Type:[place]  Needle Gauge:22 G  Placement of Spinal needle event:cerebrospinal fluid aspirated  Paresthesia: no  Fluid Appearance:clear  Medications: Mepivacaine HCl (PF) (CARBOCAINE) 1.5 % injection - Injection   4 mL - 3/6/2025 12:20:00 PM   Post Assessment  Patient Tolerance:patient tolerated the procedure well with no apparent complications  Complications no  Additional Notes  Procedure:  Pt assisted to sitting position, with legs in position of comfort over side of bed.  Pt. instructed in optimal spine presentation, the spine was prepped/ Draped and the skin at insertion site was anesthetized with 1% Lidocaine 2 ml.  The spinal needle was then advanced until CSF flow was obtained and LA was injected:

## 2025-03-06 NOTE — H&P
Patient Name: Lesvia Madera  MRN: [id number]  : 1945  DOS: 3/6/2025    Attending: Bar Olivera,*    Primary Care Provider: Luigi Mckeon MD      Chief complaint: Right knee Pain.    Subjective   Patient is a pleasant 79 y.o. female presented for scheduled surgery by Dr. Olivera    She is known to me from prior hospitalization in May of last year when she had left total knee arthroplasty, she did well postop and with subsequent rehab.    Per his note: (Her knee has been painful for several years. She uses a cane for ambulation. She denies recent falls. She had left knee replacement in May and recovered well. )    Patient underwent right total knee arthroplasty under spinal anesthesia, tolerated surgery well.  Adductor canal nerve block catheter was placed by acute pain service, and patient was admitted for further management.    Seen postoperatively, doing well with good pain control, no complaints of nausea, vomiting, or shortness of breath.  Patient has no history of DVT or PE.  She is on chronic anticoagulation with Eliquis due to A-fib.    Reviewed with patient past medical history and home medications    Allergies:  Allergies   Allergen Reactions    Pregabalin Dizziness    Sulfamethoxazole-Trimethoprim Hives       Meds:  Medications Prior to Admission   Medication Sig Dispense Refill Last Dose/Taking    ATORVASTATIN CALCIUM PO Take  by mouth Daily.   2025    bisoprolol (ZEBeta) 5 MG tablet Take 1 tablet by mouth Daily. 1.5 BID   3/6/2025 at  8:00 AM    digoxin (LANOXIN) 125 MCG tablet Take 1 tablet by mouth Daily.   3/6/2025 at  8:00 AM    DULoxetine HCl (CYMBALTA PO) Take  by mouth Daily.   3/6/2025 at  8:00 AM    acetaminophen (TYLENOL) 500 MG tablet Take 2 tablets by mouth Every 6 (Six) Hours As Needed for Mild Pain or Moderate Pain. 30 tablet 0 More than a month    apixaban (ELIQUIS) 2.5 MG tablet tablet Take 1 tablet by mouth Every 12 (Twelve) Hours. (Patient taking  differently: Take 1 tablet by mouth Every 12 (Twelve) Hours. PATIENT TO CALL PROVIDER REGARDING HOLDING THIS MEDICATION PRIOR TO SURGERY SCHEDULED FOR 03/06/2025. PATIENT VERBALIZED UNDERSTANDING.) 60 tablet 0 3/3/2025 at  8:00 AM    dapagliflozin Propanediol (Farxiga) 10 MG tablet Take  by mouth.   2/27/2025    Dulaglutide (TRULICITY SC) Inject  under the skin into the appropriate area as directed 1 (One) Time Per Week. WEDNESDAY             Past Medical History:   Diagnosis Date    Arthritis     BILAT HANDS    Atrial fibrillation 01/2000    Diabetes mellitus     Hyperlipidemia     Hypertension     Tailbone injury     PT STATES ITS CROOKED A BIT    Wears glasses     READERS     Past Surgical History:   Procedure Laterality Date    ANKLE SURGERY Right     PINS AND PLATES PLACED THEN REMOVED - NO HARDWARE IN PLACE NOW    BREAST BIOPSY Right     X1- NO MARKER    CATARACT EXTRACTION, BILATERAL Bilateral     TOTAL KNEE ARTHROPLASTY Left 5/9/2024    Procedure: TOTAL KNEE ARTHROPLASTY WITH CORI ROBOT - LEFT;  Surgeon: Bar Olivera MD;  Location: [facility];  Service: Robotics - Ortho;  Laterality: Left;    WISDOM TOOTH EXTRACTION       History reviewed. No pertinent family history.  Social History     Tobacco Use    Smoking status: Never    Smokeless tobacco: Never   Vaping Use    Vaping status: Never Used   Substance Use Topics    Alcohol use: Not Currently    Drug use: Never       Review of Systems  Pertinent items are noted in HPI    Vital Signs  /90   Pulse 64   Temp 97.6 °F (36.4 °C)   Resp 18   SpO2 91%     Physical Exam:    General Appearance:    Alert, cooperative, in no acute distress   Head:    Normocephalic, without obvious abnormality, atraumatic   Eyes:            Lids and lashes normal, conjunctivae and sclerae normal, no   icterus, no pallor, corneas clear    Ears:    Ears appear intact with no abnormalities noted   Throat:   No oral lesions, no thrush, oral mucosa moist   Neck:   " No adenopathy, supple, trachea midline, no thyromegaly         Lungs:     Clear to auscultation,respirations regular, even and                   unlabored    Heart:    Irregular rhythm and normal rate, normal S1 and S2, no       murmur, no gallop   Abdomen:     Normal bowel sounds, no masses, no organomegaly, soft        nontender, nondistended, no guarding, no rebound                 tenderness   Genitalia:    Deferred   Extremities: Right LE, CDI dressing on knee, PNB cath present.    Pulses:   Pulses palpable and equal bilaterally   Skin:   No bleeding, bruising or rash   Neurologic:   Cranial nerves 2 - 12 grossly intact, intact flexion and dorsiflexion bilateral feet      I reviewed the patient's new clinical results.             Invalid input(s): \"NEUTOPHILPCT\"        Invalid input(s): \"LABALBU\", \"PROT\"  Lab Results   Component Value Date    HGBA1C 6.70 (H) 02/27/2025      Latest Reference Range & Units 02/27/25 09:29   Sodium 136 - 145 mmol/L 144   Potassium 3.5 - 5.2 mmol/L 4.9   Chloride 98 - 107 mmol/L 107   CO2 22.0 - 29.0 mmol/L 26.0   Anion Gap 5.0 - 15.0 mmol/L 11.0   BUN 8 - 23 mg/dL 14   Creatinine 0.57 - 1.00 mg/dL 0.50 (L)   BUN/Creatinine Ratio 7.0 - 25.0  28.0 (H)   eGFR >60.0 mL/min/1.73 96.1   Glucose 65 - 99 mg/dL 136 (H)   Calcium 8.6 - 10.5 mg/dL 9.1   Hemoglobin A1C 4.80 - 5.60 % 6.70 (H)   Protime 12.2 - 14.5 Seconds 14.2   INR 0.89 - 1.12  1.08   WBC 3.40 - 10.80 10*3/mm3 7.23   RBC 3.77 - 5.28 10*6/mm3 4.95   Hemoglobin 12.0 - 15.9 g/dL 15.3   Hematocrit 34.0 - 46.6 % 47.2 (H)   Platelets 140 - 450 10*3/mm3 241   RDW 12.3 - 15.4 % 13.9   MCV 79.0 - 97.0 fL 95.4   MCH 26.6 - 33.0 pg 30.9   MCHC 31.5 - 35.7 g/dL 32.4   MPV 6.0 - 12.0 fL 8.8   RDW-SD 37.0 - 54.0 fl 48.7   (L): Data is abnormally low  (H): Data is abnormally high      Assessment and Plan:       Status post right knee replacement    Hypertension    A-fib    Diabetes    Primary localized osteoarthritis of right " knee      Plan  1. PT/OT,  Weight bearing as tolerated right LE  2. Pain control-prns, ACB cath with ropivacaine infusion.  3. IS-encourage  4. DVT proph- Mechanicals and apixaban  5. Bowel regimen  6. Resume home medications as appropriate  7.  DC planning for home    Patient is very motivated to work with physical therapy and achieve  mobility and pain control among other goals for possible discharge home later in the day.    We reviewed these goals and discussed with patient tracking  progress for the next few hours and if all is achieved to receive next antibiotic prophylactic dose and be discharged home.     We discussed medications and precriptions at time of discharge including DVT prophylaxis, pain control, and bowel regimen.  All questions were answered .    Patient expressed understanding and agreement.      Dragon disclaimer:  Part of this encounter note is an electronic transcription/translation of spoken language to printed text. The electronic translation of spoken language may permit erroneous, or at times, nonsensical words or phrases to be inadvertently transcribed; Although I have reviewed the note for such errors, some may still exist.    Betty Mcgregor MD  03/06/25  15:26 EST

## 2025-03-06 NOTE — BRIEF OP NOTE
TOTAL KNEE ARTHROPLASTY WITH CORI ROBOT  Progress Note    Levsia Madera  3/6/2025    Pre-op Diagnosis:   Right knee osteoarthritis       Post-Op Diagnosis Codes:     * Primary localized osteoarthritis of right knee [M17.11]    Procedure(s):      Procedure(s):  TOTAL KNEE ARTHROPLASTY WITH CORI ROBOT RIGHT    Surgical Approach: Knee Medial Parapatellar            Surgeon(s):  Bar Olivera MD    Assist: BOBBY Rosas    Anesthesia: Spinal with local and adductor canal catheter    Staff:   Circulator: Ketan Matias RN  Scrub Person: Marge Swenson; Tatum Pagan  Vendor Representative: Subhash Green ([employer])  Nursing Assistant: Parris Lloyd  Assistant: Mino Cullen RNFA  Orientee: Jeannette Astorga RNA  Assistant: Mino Cullen RNFA    Estimated Blood Loss: 50 mL    Urine Voided: * No values recorded between 3/6/2025 12:14 PM and 3/6/2025  2:02 PM *    Specimens:                None      Drains: * No LDAs found *    Findings: Right knee severe tricompartmental degenerative changes with varus deformity      Complications: None    Implants: [employer] posterior stabilized total knee arthroplasty with a size 6 narrow femur, 4 tibia, 9 polyethylene and 32 patella    Tourniquet time: 79 minutes at 300 mmHg    Assistant: Mino Cullen RNFA  was responsible for performing the following activities: Retraction, assistance with implantation of components and closure and their skilled assistance was necessary for the success of this case.    Bar Olivera MD     Date: 3/6/2025  Time: 14:16 EST

## 2025-03-06 NOTE — ANESTHESIA PROCEDURE NOTES
Peripheral Block      Patient reassessed immediately prior to procedure    Patient location during procedure: post-op  Reason for block: at surgeon's request and post-op pain management  Performed by  CRNA/CAA: Joseph Callahan CRNASRNA: Gita Mcdonald SRNA  Preanesthetic Checklist  Completed: patient identified, IV checked, site marked, risks and benefits discussed, surgical consent, monitors and equipment checked, pre-op evaluation and timeout performed  Prep:  Pt Position: supine  Sterile barriers:cap, gloves, mask, sterile barriers and washed/disinfected hands  Prep: ChloraPrep  Patient monitoring: blood pressure monitoring, continuous pulse oximetry and EKG  Procedure  Performed under: spinal  Guidance:ultrasound guided    ULTRASOUND INTERPRETATION.  Using ultrasound guidance a 20 G gauge needle was placed in close proximity to the nerve, at which point, under ultrasound guidance anesthetic was injected in the area of the nerve and spread of the anesthesia was seen on ultrasound in close proximity thereto.  There were no abnormalities seen on ultrasound; a digital image was taken; and the patient tolerated the procedure with no complications. Images:still images obtained, printed/placed on chart    Laterality:right  Block Type:adductor canal block  Injection Technique:catheter  Needle Type:Tuohy and echogenic  Needle Gauge:18 G  Resistance on Injection: none  Catheter Size:20 G (20g)  Cath Depth at skin: 10 cm    Medications Used: bupivacaine PF (MARCAINE) 0.25 % injection - Injection   20 mL - 3/6/2025 2:05:00 PM      Post Assessment  Injection Assessment: negative aspiration for heme, incremental injection and no paresthesia on injection  Patient Tolerance:comfortable throughout block  Complications:no  Additional Notes  CATHETER   A high-frequency linear transducer, with sterile cover, was placed on the anterior mid-thigh (between the anterior superior iliac spine and patella). The transducer was then  "moved medially to identify the Sartorius muscle (Carmina), Vastus Medialis muscle (VMM), Superficial Femoral Artery (SFA) and Vein. The transducer was then moved cephalad or caudad to position the SFA in the middle of the Carmina. The insertion site was prepped and draped in sterile fashion. Skin and cutaneous tissue was infiltrated with 2-5 ml of 1% Lidocaine. Using ultrasound-guidance, an 18-gauge [employer]iplex Ultra 360 Touhy needle was advanced in plane from lateral to medial. Preservative-free normal saline was utilized for hydro-dissection of tissue, advancement of Touhy, and to confirm needle placement below the fascial plane of the Carmina where the Nerve to the VMM is located. Local anesthetic (LA) 5 ml deposited here. The Touhy needle continues its path lateral to the SFA at the level of the Saphenous Nerve. The remainder of the LA was deposited at the 10-11 o'clock position of the SFA. This injection created a space between the Carmina and the SFA. Aspiration every 5 ml to prevent intravascular injection. Injection was completed with negative aspiration of blood and negative intravascular injection. Injection pressures were normal with minimal resistance. A 20-gauge Contiplex Echo catheter was placed through the needle and advance out the tip of the Touhy 3-5 cm anterior to the SFA. The Touhy needle was then removed, and final catheter position verified at the 12 o'clock position to the SFA. The catheter was secured in the usual fashion with skin glue, benzoin, steri-strips, CHG tegaderm and label noting \"Nerve Block Catheter\". Jerk tape applied at yellow connector and catheter connection.   Performed by: Gita Mcdonald SRNA            "

## 2025-03-06 NOTE — PLAN OF CARE
Goal Outcome Evaluation:  Plan of Care Reviewed With: (P) patient        Progress: (P) no change  Outcome Evaluation: (P) Pt presents POD#0 following R TKA. Ambulated ~220' with FWW and CGA x2, progressing to good step-through gait pattern. Navigated 1 step ascending posteriorly with FWW and CGA x2, requiring VCs for LE/AD sequencing. Pt steady throughout session with no overt LOB or knee buckling. Pt cleared for d/c home from PT standpoint. HEP reviewed at bedside and frequent ambulation encouraged. When medically appropriate, recommend d/c home with assist from son and home health PT services; pt agreeable.    Anticipated Discharge Disposition (PT): (P) home with assist, home with home health

## 2025-03-06 NOTE — DISCHARGE INSTRUCTIONS
InfuBLOCK - Patient Information    What is a pain pump?  The InfuBLOCK pump delivers post-operative, non-narcotic, numbing medication to the nerve near the surgical site for pain relief.     Where can I find information about my pain pump?           For more information about your pain pump, scan the QR code.  For additional patient resources, visit [email or web address].                                                                                               While your physician is your primary source for information about your treatment there may be times during your treatment that you need assistance with your infusion pump.     If you need assistance take the following steps:    The [employer] Nursing Hotline is Here for You 24/7.  Please call [phone] for the following concerns or complications:    Answers to questions about your infusion pump                 Tubing disconnect  Assistance with pump alarms                                                      Dislodged catheter  Excessive leakage noted from pump                                         Inadequate pain control    2.   [facility] Anesthesia Acute Pain Service: [phone] is available 24/7 for any further needs or concerns about medication or pain control.     -------------------------------------------------------------------------    Nerve Catheter Removal Instructions  When your device is empty:    Remove your catheter by pulling the dressing off slowly (like you would remove a regular bandage). The catheter should pull right out of the skin.  Check that the BLUE tip is intact.                                                                                     If the catheter is stuck, reposition your   extremity and pull slowly until removed.  *If catheter is HURTING and WON'T come out, stop and call [phone] for further assistance.    Remove medication bag from the black carrying case.  Cut the  tubing on right and left side of pump, and discard the medication bag and tubing into garbage.  Place the pump and black carrying case into the plastic bag and then place this into the return box.  Seal box with blue stickers and return to [employer] postal service. THIS IS PRE-PAID POSTAGE.        -------------------------------------------------------------------------    [facility] COLD THERAPY - PATIENT INSTRUCTION SHEET    Cold Compression Therapy for your comfort and rehabilitation  Your caregivers want you to be productive in your rehab and comfortable during your stay. In keeping with those goals, you will be receiving an SMI Cold Therapy Wrap to help ease post-operative pain and swelling that might keep you from getting back on track! Your SMI Cold Therapy Wrap is effective and simple-to-use, and you will be encouraged to apply it throughout your hospital stay and at home through the duration of your recovery.    When you are ready to go home  Be sure to take your SMI Cold Therapy Wrap and both sets of Gel Bags with you for continued comfort and use throughout your rehabilitation. If you don't already have them, ask your nurse or aide to retrieve your SMI Gel Bags from the patient freezer.    Home use precautions  Always follow your medical professional's application instructions upon discharge. Your SMI Cold Therapy Wrap and Gel Bags are designed to last for months following your surgery. Never heat the Gel Bags unless specified by your healthcare provider. Supervision is advised when using this product on children or geriatric patients. To avoid danger of suffocation, please keep the outer plastic packaging away from children & pets.    Cold Therapy Instructions  Place Gel Bags in a freezer set ¾ of the way to max temperature for at least (4) hours. For best results, lay the Gel Bags flat and [id number] in the freezer. Once frozen, slide Gel Bags into the gel pouch and secure your wrap to the affected area with the  straps.  Gel wraps that have been stored in a freezer for an extended period of time may require a (10) minute period of softening up in a room temperature environment before application.  The gel pouch acts as a protective barrier. NEVER place frozen bags directly onto skin, as this may cause frostbite injury.  The [facility] Cold Therapy Wrap is designed to be able to be worm while ambulating. The compression straps can be secured well enough so that the Wrap won't fall off while moving.  Wrap Application Videos can be viewed at [employer].  An additional protective barrier such as clothing, a washcloth, hand-towel or pillowcase may be used during prolonged treatment applications.  The Gel-Pouch and Wrap are both Latex-Free and the Gel Bag ingredients are non toxic.    [facility] Wrap care instructions  The [facility] Cold Therapy Wrap may be hand washed and hung to dry when needed.    [facility] re-order information  Additional [facility] body specific wraps and/or Gel Bags can be re-ordered from [employer] or call [email or web address] ([phone])        InfuBLOCK - Patient Information    What is a pain pump?  The InfuBLOCK pump delivers post-operative, non-narcotic, numbing medication to the nerve near the surgical site for pain relief.     Where can I find information about my pain pump?           For more information about your pain pump, scan the QR code.  For additional patient resources, visit [email or web address].                                                                                               While your physician is your primary source for information about your treatment there may be times during your treatment that you need assistance with your infusion pump.     If you need assistance take the following steps:    The [employer] Nursing Hotline is Here for You 24/7.  Please call [phone] for the following concerns or complications:    Answers to questions about your infusion  pump                 Tubing disconnect  Assistance with pump alarms                                                      Dislodged catheter  Excessive leakage noted from pump                                         Inadequate pain control    2.   [facility] Anesthesia Acute Pain Service: [phone] is available 24/7 for any further needs or concerns about medication or pain control.     -------------------------------------------------------------------------    Nerve Catheter Removal Instructions  When your device is empty:    Remove your catheter by pulling the dressing off slowly (like you would remove a regular bandage). The catheter should pull right out of the skin.  Check that the BLUE tip is intact.                                                                                     If the catheter is stuck, reposition your   extremity and pull slowly until removed.  *If catheter is HURTING and WON'T come out, stop and call [phone] for further assistance.    Remove medication bag from the black carrying case.  Cut the tubing on right and left side of pump, and discard the medication bag and tubing into garbage.  Place the pump and black carrying case into the plastic bag and then place this into the return box.  Seal box with blue stickers and return to US postal service. THIS IS PRE-PAID POSTAGE.        -------------------------------------------------------------------------    [facility] COLD THERAPY - PATIENT INSTRUCTION SHEET    Cold Compression Therapy for your comfort and rehabilitation  Your caregivers want you to be productive in your rehab and comfortable during your stay. In keeping with those goals, you will be receiving an [facility] Cold Therapy Wrap to help ease post-operative pain and swelling that might keep you from getting back on track! Your SMI Cold Therapy Wrap is effective and simple-to-use, and you will be encouraged to apply it throughout your hospital stay and at home through the  duration of your recovery.    When you are ready to go home  Be sure to take your SMI Cold Therapy Wrap and both sets of Gel Bags with you for continued comfort and use throughout your rehabilitation. If you don't already have them, ask your nurse or aide to retrieve your SMI Gel Bags from the patient freezer.    Home use precautions  Always follow your medical professional's application instructions upon discharge. Your SMI Cold Therapy Wrap and Gel Bags are designed to last for months following your surgery. Never heat the Gel Bags unless specified by your healthcare provider. Supervision is advised when using this product on children or geriatric patients. To avoid danger of suffocation, please keep the outer plastic packaging away from children & pets.    Cold Therapy Instructions  Place Gel Bags in a freezer set ¾ of the way to max temperature for at least (4) hours. For best results, lay the Gel Bags flat and [id number] in the freezer. Once frozen, slide Gel Bags into the gel pouch and secure your wrap to the affected area with the straps.  Gel wraps that have been stored in a freezer for an extended period of time may require a (10) minute period of softening up in a room temperature environment before application.  The gel pouch acts as a protective barrier. NEVER place frozen bags directly onto skin, as this may cause frostbite injury.  The SMI Cold Therapy Wrap is designed to be able to be worm while ambulating. The compression straps can be secured well enough so that the Wrap won't fall off while moving.  Wrap Application Videos can be viewed at [email or web address].  An additional protective barrier such as clothing, a washcloth, hand-towel or pillowcase may be used during prolonged treatment applications.  The Gel-Pouch and Wrap are both Latex-Free and the Gel Bag ingredients are non toxic.    SMI Wrap care instructions  The SMI Cold Therapy Wrap may be hand washed and hung to dry when  needed.    [facility] re-order information  Additional [facility] body specific wraps and/or Gel Bags can be re-ordered from [email or web address] or call 889-[phone] ([phone])

## 2025-03-06 NOTE — H&P
Pre-Op H&P  Lesvia Madera  [id number]  1945      Chief complaint: Right knee pain      Subjective:  Patient is a 79 y.o.female presents for scheduled surgery by Dr. Olivera.  She anticipates a TOTAL KNEE ARTHROPLASTY WITH CORI ROBOT RIGHT  today.  Her knee has been painful for several years.  She uses a cane for ambulation.  She denies recent falls.  She had left knee replacement in May and recovered well.      Review of Systems:  Constitutional-- No fever, chills or sweats. No fatigue.  CV-- No chest pain, palpitation or syncope. +HTN, HLD, afib  Resp-- No SOB, cough, hemoptysis  Skin--No rashes or lesions      Allergies:   Allergies   Allergen Reactions    Pregabalin Dizziness    Sulfamethoxazole-Trimethoprim Hives         Home Meds:  Medications Prior to Admission   Medication Sig Dispense Refill Last Dose/Taking    acetaminophen (TYLENOL) 500 MG tablet Take 2 tablets by mouth Every 6 (Six) Hours As Needed for Mild Pain or Moderate Pain. 30 tablet 0     apixaban (ELIQUIS) 2.5 MG tablet tablet Take 1 tablet by mouth Every 12 (Twelve) Hours. (Patient taking differently: Take 1 tablet by mouth Every 12 (Twelve) Hours. PATIENT TO CALL PROVIDER REGARDING HOLDING THIS MEDICATION PRIOR TO SURGERY SCHEDULED FOR 03/06/2025. PATIENT VERBALIZED UNDERSTANDING.) 60 tablet 0     ATORVASTATIN CALCIUM PO Take  by mouth Daily.       Biotin 5000 MCG capsule 1 capsule Daily.       bisoprolol (ZEBeta) 5 MG tablet Take 1 tablet by mouth Daily. 1.5 BID       dapagliflozin Propanediol (Farxiga) 10 MG tablet Take  by mouth.       digoxin (LANOXIN) 125 MCG tablet Take 1 tablet by mouth Daily.       Dulaglutide (TRULICITY SC) Inject  under the skin into the appropriate area as directed 1 (One) Time Per Week. WEDNESDAY       DULoxetine HCl (CYMBALTA PO) Take  by mouth Daily.            PMH:   Past Medical History:   Diagnosis Date    Arthritis     BILAT HANDS    Atrial fibrillation 01/2000    Diabetes mellitus     Hyperlipidemia      Hypertension     Tailbone injury     PT STATES ITS CROOKED A BIT    Wears glasses     READERS     PSH:    Past Surgical History:   Procedure Laterality Date    ANKLE SURGERY Right     PINS AND PLATES PLACED THEN REMOVED - NO HARDWARE IN PLACE NOW    BREAST BIOPSY Right     X1- NO MARKER    CATARACT EXTRACTION, BILATERAL Bilateral     TOTAL KNEE ARTHROPLASTY Left 5/9/2024    Procedure: TOTAL KNEE ARTHROPLASTY WITH CORI ROBOT - LEFT;  Surgeon: Bar Olivera MD;  Location: [facility];  Service: Robotics - Ortho;  Laterality: Left;    WISDOM TOOTH EXTRACTION         Immunization History:  Influenza: UTD  Pneumococcal: UTD  Tetanus: UTD    Social History:   Tobacco:   Social History     Tobacco Use   Smoking Status Never   Smokeless Tobacco Never      Alcohol:     Social History     Substance and Sexual Activity   Alcohol Use Not Currently         Physical Exam: VS: /94  HR 80  RR 16  T 98.4  Sat 96%RA      General Appearance:    Alert, cooperative, no distress, appears stated age   Head:    Normocephalic, without obvious abnormality, atraumatic   Lungs:     Clear to auscultation bilaterally, respirations unlabored    Heart:   Regular rate and rhythm, S1 and S2 normal    Abdomen:    Soft without tenderness   Extremities:   Extremities normal, atraumatic, no cyanosis or edema   Skin:   Skin color, texture, turgor normal, no rashes or lesions   Neurologic:   Grossly intact     Results Review:     LABS:  Lab Results   Component Value Date    WBC 7.23 02/27/2025    HGB 15.3 02/27/2025    HCT 47.2 (H) 02/27/2025    MCV 95.4 02/27/2025     02/27/2025    NEUTROABS 8.94 (H) 05/11/2024    GLUCOSE 136 (H) 02/27/2025    BUN 14 02/27/2025    CREATININE 0.50 (L) 02/27/2025     02/27/2025    K 4.9 02/27/2025     02/27/2025    CO2 26.0 02/27/2025    MG 2.0 05/11/2024    CALCIUM 9.1 02/27/2025       RADIOLOGY:  Imaging Results (Last 72 Hours)       ** No results found for the last 72 hours. **             I reviewed the patient's new clinical results.    Cancer Staging (if applicable)  Cancer Patient: __ yes __no __unknown; If yes, clinical stage T:__ N:__M:__, stage group or __N/A      Impression: Right knee pain      Plan: TOTAL KNEE ARTHROPLASTY WITH CORI ROBOT RIGHT       Shaunna Morgan, APRN   3/6/2025   10:10 EST

## 2025-03-06 NOTE — OP NOTE
Preoperative diagnosis:Right knee end stage osteoarthritis    Postoperative diagnosis: Right knee end stage osteoarthritis    Operative procedure: Right total knee arthroplasty with CORI robot computer guidance    Surgeon: Dr. Nir Olivera    Assistant: BOBBY Rosas.  Necessary during the case for assistance with positioning of the patient, exposure, implantation of components and closure.  Necessary for the success and completion of the case.    Anesthesia: Spinal with local and adductor canal catheter    Estimated blood loss: Minimal    Surgical Approach: Knee Medial Parapatellar     Implants: [employer] posterior stabilized total knee arthroplasty size 6 narrow femur, 4 tibia, 32 patella, 9 polyethylene.    Tourniquet time: 79 minutes at 300 mmHg    Indications for procedure: Lesvia is a very pleasant 79-year-old female who has struggled with end-stage activity limiting right knee pain secondary to osteoarthritis.  X-rays in March 2024 revealed severe tricompartmental degenerative changes in a varus knee with complete loss of medial joint space and marginal osteophyte formation.  They have failed exhaustive conservative treatment measures including medication, activity modification, injections and physical therapy.  Left knee doing well status post knee replacement May 2024.  She takes Eliquis for A-fib which was stopped a few days ago.  Lives at home alone but her son plans to stay with her after surgery.  She has done physical therapy at [facility].  After undergoing a shared decision-making process they agreed to proceed with right total knee arthroplasty.  Surgical consent form was signed.    Description of procedure: The patient was seen in the preoperative holding area and the right knee was signed to confirm the correct operative site.  They were seen by anesthesia.  They received Ancef 2 g IV prophylactic antibiotics within 1 hour of incision time.  They were brought back to the operating room.   Spinal was performed without difficulty and they were given sedation throughout the case.  Patient placed in the supine position and all bony prominences were well-padded. Nonsterile tourniquet was applied to the thigh.  The right lower extremity was prepped and draped in the usual sterile fashion and timeout was performed to confirm right total knee arthroplasty for patient Lesvia Madera.    The right lower extremity was exsanguinated with an Esmarch.  Tourniquet was inflated to 300 mmHg.  With the knee flexed a midline incision was made with a 10 blade scalpel.  Adequate hemostasis maintained with Bovie electrocautery.  Full-thickness medial and lateral flaps were elevated.  Using a fresh 10 blade scalpel I made a medial parapatellar arthrotomy.  The patella was everted.  Patella fat pad and anterior femoral fat pads were excised.  Marginal osteophytes were removed.  Medial release was performed for this varus knee using Bovie electrocautery.  Z retractors were placed medially and laterally.    Guide pins for the CORI were placed in the medial tibia shaft and distal medial femur. Sensors were placed and data capture was performed per standard CORI computer guidance technique.  Femoral and tibial sizes were determined.    The distal femoral cut was then made with a dominguez at the previously selected depth and amount of valgus (3-5 degrees). The 4-in-1 cutting guide for the previously selected femur size of 6 was pinned in place at the appropriate amount of external rotation.  Anterior and posterior cuts were made as were the chamfer cuts.  Cut bone was removed.  We then turned our attention to the tibia.    The tibia was subluxed anteriorly. PCL retractor was placed as were medial and lateral Hohmann retractors.  The tibial cutting guide was then pinned in place using CORI guidance for the proximal tibial cut. We then used the oscillating saw to make the proximal tibial cut and this cut was then checked with the CORI.  Medial and lateral menisci were then excised as were posterior osteophytes.    Flexion and extension gaps were then checked and with a 9 mm block we achieved full extension and flexion with excellent alignment. The tibia was sized to a 4 tibial tray centered off the medial third of the tibial tubercle.  The tray was pinned in place.  We then impacted the tibial fins.  Size 6  trial femur was then placed and box cut was made with the reamer and punch. We trialed with a size 9 polyethylene, 6 femur and 4 tibia.  With these trial components in place we achieved full extension and flexion with excellent alignment and stable throughout.     We then turned our attention to the patella.  Patella was sized to 22 mm in thickness and we made a 9 mm patellar cut with the reciprocal saw.  Patella drill holes were made with the appropriate size guide.  A 32 trial button was placed and there was excellent tracking with the no thumbs technique.    Trial components were then removed.  Final components were opened on the back table.  Posterior capsule was injected with 20 mL of half percent ropivacaine and 5 mg of morphine.  Cement was mixed on the back table.  The knee was copiously irrigated with Pulsavac lavage.  The knee was thoroughly dried.  Cement was then applied to the tibia and final size 4 tibial tray was impacted in place and excess cement was removed.  Cement was applied to the femur and final size 6 narrow femur was impacted in place and excess cement removed.  We then placed a 9 mm polyethylene-this was seen to be fully seated in the tibial tray.  Knee was then placed in extension and we applied cement to the cut patellar surface and 32 patella was held in place with patellar clamp.  All excess cement was removed.  The knee was left in extension while cement cured.  While the cement cured we removed the CORI tibial and femoral pins and sensors.  Tibial pin incisions were closed with 3-0 monocryl.    Once the cement  was fully cured we irrigated the knee with diluted Betadine solution and Pulsavac lavage.  The knee was taken through full range of motion and seen to achieve full extension and flexion with excellent patellar tracking.    We then proceeded with closure.  The deep fascia was closed with a #1 Stratafix barbed suture.  Dermis was closed with 2-0 Vicryl.  Skin was closed with a running 3-0 Stratafix barbed subcuticular suture.    A sterile dressing was then applied with mesh dressing and glue.  We then applied 4x4's, ABDs, soft roll and a six-inch Ace bandage.    Tourniquet was deflated at 79 minutes.  Patient was transferred to recovery in stable condition.  All sponge and needle counts were correct ×2.  Patient received first dose of tranexamic acid just prior to incision and the second dose 5-10 minutes prior to letting down the tourniquet to minimize bleeding.    Postoperative plan:  Patient will be admitted to Dr. WOOTEN for medical management  Mobilize starting today with PT/OT as tolerated  Start Eliquis for DVT prophylaxis tomorrow   consult for physical therapy and home equipment needs  Follow-up with me in 2-3 weeks.    Bar Olivera MD  03/06/25  14:17 EST    CC: Dr. Luigi Mckeon

## 2025-03-06 NOTE — ANESTHESIA PREPROCEDURE EVALUATION
Anesthesia Evaluation     Patient summary reviewed and Nursing notes reviewed                Airway   Mallampati: II  TM distance: >3 FB  Neck ROM: full  No difficulty expected  Dental - normal exam   (+) poor dentition    Pulmonary - negative pulmonary ROS and normal exam   Cardiovascular     Rhythm: irregular  Rate: normal    (+) hypertension, dysrhythmias (xarelto tx; last dose > 72 hrs ago) Paroxysmal Atrial Fib, hyperlipidemia      Neuro/Psych- negative ROS  GI/Hepatic/Renal/Endo    (+) morbid obesity, diabetes mellitus    Musculoskeletal (-) negative ROS    Abdominal  - normal exam    Bowel sounds: normal.   Substance History - negative use     OB/GYN negative ob/gyn ROS         Other                      Anesthesia Plan    ASA 3     spinal     (Peripheral nerve block + cath for post op pain relief)  intravenous induction     Anesthetic plan, risks, benefits, and alternatives have been provided, discussed and informed consent has been obtained with: patient.    Plan discussed with CRNA.      CODE STATUS:

## (undated) DEVICE — SOL PVPI 10PCT 0.75OZ PEEL/PCH/PACKET 1P/U STRL

## (undated) DEVICE — PK KN TOTL 10

## (undated) DEVICE — 3M™ IOBAN™ 2 ANTIMICROBIAL INCISE DRAPE 6651EZ: Brand: IOBAN™ 2

## (undated) DEVICE — TRAP FLD MINIVAC MEGADYNE 100ML

## (undated) DEVICE — TOWEL,OR,DSP,ST,BLUE,STD,4/PK,20PK/CS: Brand: MEDLINE

## (undated) DEVICE — BLANKT WARM UPPR/BDY ARM/OUT 57X196CM

## (undated) DEVICE — Device

## (undated) DEVICE — PATIENT RETURN ELECTRODE, SINGLE-USE, CONTACT QUALITY MONITORING, ADULT, WITH 9FT CORD, FOR PATIENTS WEIGING OVER 33LBS. (15KG): Brand: MEGADYNE

## (undated) DEVICE — TRY EPID SFTY 18G 3.5IN 1T7680

## (undated) DEVICE — SYS SKIN EXOFIN WND CLS 4X22CM

## (undated) DEVICE — KT PUMP INFUBLOCK MDL 2100 PMKITSOLIS

## (undated) DEVICE — STRYKER PERFORMANCE SERIES SAGITTAL BLADE: Brand: STRYKER PERFORMANCE SERIES

## (undated) DEVICE — INTENDED FOR TISSUE SEPARATION, AND OTHER PROCEDURES THAT REQUIRE A SHARP SURGICAL BLADE TO PUNCTURE OR CUT.: Brand: BARD-PARKER ® STAINLESS STEEL BLADES

## (undated) DEVICE — BNDG ELAS W/CLIP 6IN 10YD LF STRL

## (undated) DEVICE — PAD,ARMBOARD,CONV,FOAM,2X8X20",12PR/CS: Brand: MEDLINE

## (undated) DEVICE — SPINAL TRAY/P: Brand: MEDLINE INDUSTRIES, INC.

## (undated) DEVICE — UNDERCAST PADDING: Brand: DEROYAL

## (undated) DEVICE — TBG PENCL TELESCP MEGADYNE SMOKE EVAC 10FT

## (undated) DEVICE — PENCL SMOKE/EVAC MEGADYNE TELESCP 10FT

## (undated) DEVICE — 3 BONE CEMENT MIXER: Brand: MIXEVAC

## (undated) DEVICE — NEEDLE, QUINCKE, 18GX3.5": Brand: MEDLINE

## (undated) DEVICE — DRSNG PAD ABD 8X10IN STRL

## (undated) DEVICE — CVR FTSWITCH UNIV

## (undated) DEVICE — SUT MONOCRYL PLS ANTIB UND 3/0  PS1 27IN

## (undated) DEVICE — ANTIBACTERIAL UNDYED BRAIDED (POLYGLACTIN 910), SYNTHETIC ABSORBABLE SUTURE: Brand: COATED VICRYL

## (undated) DEVICE — GLV SURG PREMIERPRO MIC LTX PF SZ8 BRN

## (undated) DEVICE — SYR LUERLOK 30CC

## (undated) DEVICE — SYS CLS SKIN PREMIERPRO EXOFINFUSION 22CM

## (undated) DEVICE — SHEET,DRAPE,53X77,STERILE: Brand: MEDLINE